# Patient Record
Sex: FEMALE | Race: WHITE | NOT HISPANIC OR LATINO | Employment: FULL TIME | URBAN - METROPOLITAN AREA
[De-identification: names, ages, dates, MRNs, and addresses within clinical notes are randomized per-mention and may not be internally consistent; named-entity substitution may affect disease eponyms.]

---

## 2017-04-23 ENCOUNTER — TRANSFERRED RECORDS (OUTPATIENT)
Dept: HEALTH INFORMATION MANAGEMENT | Facility: CLINIC | Age: 27
End: 2017-04-23

## 2017-04-23 LAB — PAP SMEAR - HIM PATIENT REPORTED: NEGATIVE

## 2017-08-12 ENCOUNTER — HEALTH MAINTENANCE LETTER (OUTPATIENT)
Age: 27
End: 2017-08-12

## 2017-11-17 ENCOUNTER — OFFICE VISIT (OUTPATIENT)
Dept: FAMILY MEDICINE | Facility: CLINIC | Age: 27
End: 2017-11-17
Payer: COMMERCIAL

## 2017-11-17 VITALS
BODY MASS INDEX: 20.14 KG/M2 | DIASTOLIC BLOOD PRESSURE: 66 MMHG | HEART RATE: 71 BPM | SYSTOLIC BLOOD PRESSURE: 102 MMHG | HEIGHT: 69 IN | TEMPERATURE: 98.6 F | OXYGEN SATURATION: 98 % | WEIGHT: 136 LBS

## 2017-11-17 DIAGNOSIS — F90.9 ATTENTION DEFICIT HYPERACTIVITY DISORDER (ADHD), UNSPECIFIED ADHD TYPE: Primary | ICD-10-CM

## 2017-11-17 PROCEDURE — 99000 SPECIMEN HANDLING OFFICE-LAB: CPT | Performed by: PHYSICIAN ASSISTANT

## 2017-11-17 PROCEDURE — 99203 OFFICE O/P NEW LOW 30 MIN: CPT | Performed by: PHYSICIAN ASSISTANT

## 2017-11-17 PROCEDURE — 80307 DRUG TEST PRSMV CHEM ANLYZR: CPT | Mod: 90 | Performed by: PHYSICIAN ASSISTANT

## 2017-11-17 RX ORDER — TRAZODONE HYDROCHLORIDE 50 MG/1
TABLET, FILM COATED ORAL
Refills: 2 | COMMUNITY
Start: 2017-08-18 | End: 2020-09-18

## 2017-11-17 RX ORDER — DEXTROAMPHETAMINE SACCHARATE, AMPHETAMINE ASPARTATE MONOHYDRATE, DEXTROAMPHETAMINE SULFATE AND AMPHETAMINE SULFATE 3.75; 3.75; 3.75; 3.75 MG/1; MG/1; MG/1; MG/1
CAPSULE, EXTENDED RELEASE ORAL
Qty: 30 CAPSULE | Refills: 0 | Status: SHIPPED | OUTPATIENT
Start: 2017-11-17 | End: 2018-04-17 | Stop reason: DRUGHIGH

## 2017-11-17 RX ORDER — DEXTROAMPHETAMINE SACCHARATE, AMPHETAMINE ASPARTATE MONOHYDRATE, DEXTROAMPHETAMINE SULFATE AND AMPHETAMINE SULFATE 3.75; 3.75; 3.75; 3.75 MG/1; MG/1; MG/1; MG/1
CAPSULE, EXTENDED RELEASE ORAL
Refills: 0 | COMMUNITY
Start: 2017-02-05 | End: 2017-11-17

## 2017-11-17 NOTE — PATIENT INSTRUCTIONS
Prescription for the Adderall done today.  Need to have a copy of the assessment for further refills.      Please followup every 6 months for medication renewals.  Please also allow 72 business hours for medication refills to be done.

## 2017-11-17 NOTE — LETTER
[facility] [place]    11/17/17    Patient: Chanel Cotto  YOB: 1990  Medical Record Number: [id number]                                                                  Controlled Substance Agreement  I understand that my care provider has prescribed controlled substances (narcotics, tranquilizers, and/or stimulants) to help manage my condition(s).  I am taking this medicine to help me function or work.  I know that this is strong medicine.  It could have serious side effects and even cause a dependency on the drug.  If I stop these medicines suddenly, I could have severe withdrawal symptoms.    The risks, benefits, and side effects of these medication(s) were explained to me.  I agree that:  1. I will take part in other treatments as advised by my provider.  This may be psychiatry or counseling, physical therapy, behavioral therapy, group treatment, or a referral to a pain clinic.  I will reduce or stop my medicine when my provider tells me to do so.   2. I will take my medicines as prescribed.  I will not change the dose or schedule unless my provider tells me to.  There will be no refills if I  run out early.   I may be contacted at any time without warning and asked to complete a drug test or pill count.   3. I will keep all my appointments at the clinic.  If I miss appointments or fail to follow instructions, my provider may stop my medicine.  4. I will not ask other providers to prescribe controlled substances. And I will not accept controlled substances from other people. If I need another prescribed controlled substance for a new reason, I will notify my provider within one business day.  5. If I enroll in the [place] Medical Marijuana program, I will tell my provider.  I will also sign an agreement to share my medical records with my provider.  6. I will use one pharmacy to fill all of my controlled substance prescriptions.  If my prescription is mailed to my pharmacy, it may take  5 to 7 days for my medicine to be ready.  7. I understand that my provider, clinic care team, and pharmacy can track controlled substance prescriptions from other providers through a central database (prescription monitoring program).  8. I will bring in my list of medications (or my medicine bottles) each time I come to the clinic.  REV- 04/2016                                                                                                                                            Page 1 of 2      [facility] [place]    11/17/17    Patient: Chanel Cotto  YOB: 1990  Medical Record Number: [id number]    9. Refills of controlled substances will be made only during office hours.  It is up to me to make sure that I do not run out of my medicines on weekends or holidays.    10. I am responsible for my prescriptions.  If the medicine is lost or stolen, it will not be replaced.   I also agree not to share these medicines with anyone.  11. I agree to not use ANY illegal or recreational drugs.  This includes marijuana, cocaine, bath salts or other drugs.  I agree not to use alcohol unless my provider says I may.  I agree to give urine samples whenever asked.  If I fail to give a urine sample, the provider may stop my medicine.     12. I will tell my nurse or provider right away if I become pregnant or have a new medical problem treated outside of [facility].  13. I understand that this medicine can affect my thinking and judgment.  It may be unsafe for me to drive, use machinery and do dangerous tasks.  I will not do any of these things until I know how the medicine affects me.  If my dose changes, I will wait to see how it affects me.  I will contact my provider if I have concerns about medicine side effects.  I understand that if I do not follow any of the conditions above, my prescriptions or treatment may be stopped.    I agree that my provider, clinic care team, and pharmacy may work with  any city, state or federal law enforcement agency that investigates the misuse, sale, or other diversion of my controlled medicine. I will allow my provider to discuss my care with or share a copy of this agreement with any other treating provider, pharmacy or emergency room where I receive care.  I agree to give up (waive) any right of privacy or confidentiality with respect to these authorizations.   I have read this agreement and have asked questions about anything I did not understand.   ___________________________________    ___________________________  Patient Signature                                                           Date and Time  ___________________________________     ____________________________  Witness                                                                            Date and Time  ___________________________________  Xuan Ramirez PA-C  REV-  04/2016                                                                                                                                                                 Page 2 of 2

## 2017-11-17 NOTE — Clinical Note
Please abstract the following data from this visit with this patient into the appropriate field in Epic:  Pap smear done on this date: 04/23/2017 (approximately), by this group: [facility], results were normal - Q1Y.

## 2017-11-17 NOTE — MR AVS SNAPSHOT
"              After Visit Summary   11/17/2017    Chanel Cotto    MRN: [id number]           Patient Information     Date Of Birth          1990        Visit Information        Provider Department      11/17/2017 2:20 PM Xuan Ramirez PA-C [facility]        Today's Diagnoses     Attention deficit hyperactivity disorder (ADHD), unspecified ADHD type    -  1      Care Instructions    Prescription for the Adderall done today.  Need to have a copy of the assessment for further refills.      Please followup every 6 months for medication renewals.  Please also allow 72 business hours for medication refills to be done.            Follow-ups after your visit        Who to contact     If you have questions or need follow up information about today's clinic visit or your schedule please contact [facility] directly at [phone].  Normal or non-critical lab and imaging results will be communicated to you by MyChart, letter or phone within 4 business days after the clinic has received the results. If you do not hear from us within 7 days, please contact the clinic through MyChart or phone. If you have a critical or abnormal lab result, we will notify you by phone as soon as possible.  Submit refill requests through [employer] or call your pharmacy and they will forward the refill request to us. Please allow 3 business days for your refill to be completed.          Additional Information About Your Visit        TV Interactive System[employer]art Information     Fooala lets you send messages to your doctor, view your test results, renew your prescriptions, schedule appointments and more. To sign up, go to [email or web address] . Click on \"Log in\" on the left side of the screen, which will take you to the Welcome page. Then click on \"Sign up Now\" on the right side of the page.     You will be asked to enter the access code listed below, as well as some personal information. Please follow the directions to " "create your username and password.     Your access code is: [email or web address]  Expires: 1/10/2018  1:23 PM     Your access code will  in 90 days. If you need help or a new code, please call your [place] clinic or [phone].        Care EveryWhere ID     This is your Care EveryWhere ID. This could be used by other organizations to access your [place] medical records  [id number]        Your Vitals Were     Pulse Temperature Height Pulse Oximetry Breastfeeding? BMI (Body Mass Index)    71 98.6  F (37  C) (Oral) 5' 8.75\" (1.746 m) 98% No 20.23 kg/m2       Blood Pressure from Last 3 Encounters:   17 102/66   12 90/60   09/10/10 102/60    Weight from Last 3 Encounters:   17 136 lb (61.7 kg)   12 126 lb 6.4 oz (57.3 kg)   09 138 lb (62.6 kg) (74 %)*     * Growth percentiles are based on [facility] 2-20 Years data.              We Performed the Following     Drug  Screen Comprehensive, Urine w/o Reported Meds (Pain Care Package)          Today's Medication Changes          These changes are accurate as of: 17  3:41 PM.  If you have any questions, ask your nurse or doctor.               These medicines have changed or have updated prescriptions.        Dose/Directions    amphetamine-dextroamphetamine 15 MG per 24 hr capsule   Commonly known as:  ADDERALL XR   This may have changed:  See the new instructions.   Used for:  Attention deficit hyperactivity disorder (ADHD), unspecified ADHD type   Changed by:  Xuan Ramirez PA-C        Take 1 capsule PO in the morning   Quantity:  30 capsule   Refills:  0            Where to get your medicines      Some of these will need a paper prescription and others can be bought over the counter.  Ask your nurse if you have questions.     Bring a paper prescription for each of these medications     amphetamine-dextroamphetamine 15 MG per 24 hr capsule                Primary Care Provider Office Phone # Fax #    Xu Sales [phone] " [phone]       [address] [address]        Equal Access to Services     ABRAHAM ABEBE : Hadii aad ku hadpascalenory Sojocelyn, waludwinda luqadaha, qaybta kaalmada dorotheamilo, effie vargas yuelove nuñezbrandenbreana ramirez. So [facility] [phone].    ATENCIÓN: Si habla español, tiene a kent disposición servicios gratuitos de asistencia lingüística. Llame al [phone].    We comply with applicable federal civil rights laws and [place] laws. We do not discriminate on the basis of race, color, national origin, age, disability, sex, sexual orientation, or gender identity.            Thank you!     Thank you for choosing [facility]  for your care. Our goal is always to provide you with excellent care. Hearing back from our patients is one way we can continue to improve our services. Please take a few minutes to complete the written survey that you may receive in the mail after your visit with us. Thank you!             Your Updated Medication List - Protect others around you: Learn how to safely use, store and throw away your medicines at www.disposemymeds.org.          This list is accurate as of: 11/17/17  3:41 PM.  Always use your most recent med list.                   Brand Name Dispense Instructions for use Diagnosis    amphetamine-dextroamphetamine 15 MG per 24 hr capsule    ADDERALL XR    30 capsule    Take 1 capsule PO in the morning    Attention deficit hyperactivity disorder (ADHD), unspecified ADHD type       traZODone 50 MG tablet    DESYREL     PRN

## 2017-11-17 NOTE — NURSING NOTE
"Chief Complaint   Patient presents with     Recheck Medication       Initial /66 (BP Location: Left arm, Patient Position: Chair, Cuff Size: Adult Regular)  Pulse 71  Temp 98.6  F (37  C) (Oral)  Ht 5' 8.75\" (1.746 m)  Wt 136 lb (61.7 kg)  SpO2 98%  Breastfeeding? No  BMI 20.23 kg/m2 Estimated body mass index is 20.23 kg/(m^2) as calculated from the following:    Height as of this encounter: 5' 8.75\" (1.746 m).    Weight as of this encounter: 136 lb (61.7 kg).  Medication Reconciliation: complete   Csaba Mlnarik CMA    "

## 2017-11-29 LAB — COMPREHEN DRUG ANALYSIS UR: NORMAL

## 2017-12-01 NOTE — PROGRESS NOTES
Note to staff: Please call the patient to explain results.    Please make an appointment in the clinic to followup on the recent drug screen test results.        Thank you for choosing [facility] for your health care needs,      Xuan Ramirez PA-C

## 2018-01-17 ENCOUNTER — OFFICE VISIT (OUTPATIENT)
Dept: FAMILY MEDICINE | Facility: CLINIC | Age: 28
End: 2018-01-17
Payer: COMMERCIAL

## 2018-01-17 VITALS
HEART RATE: 98 BPM | BODY MASS INDEX: 20.14 KG/M2 | OXYGEN SATURATION: 100 % | TEMPERATURE: 97.6 F | SYSTOLIC BLOOD PRESSURE: 110 MMHG | WEIGHT: 136 LBS | HEIGHT: 69 IN | DIASTOLIC BLOOD PRESSURE: 76 MMHG

## 2018-01-17 DIAGNOSIS — F98.8 ADD (ATTENTION DEFICIT DISORDER) WITHOUT HYPERACTIVITY: Primary | ICD-10-CM

## 2018-01-17 DIAGNOSIS — R89.2 ABNORMAL DRUG SCREEN: ICD-10-CM

## 2018-01-17 PROCEDURE — 99213 OFFICE O/P EST LOW 20 MIN: CPT | Performed by: PHYSICIAN ASSISTANT

## 2018-01-17 NOTE — PROGRESS NOTES
"  SUBJECTIVE:                                                    Chanel Cotto is a 27 year old female who presents to clinic today for the following health issues:    Medication Followup of Adderall    Taking Medication as prescribed: yes    Side Effects:  None    Medication Helping Symptoms:  yes      Patient is here to discuss her Adderall Rx and the recent drug screen.    Patient reports that she does not regularly use marijuana, but did it when visiting sister in [place].  She says that she was in [place] over the holidays and smoked marijuana again then too.      Patient is not out of her Adderall medication.  She took the last pill today.  She reports that she takes the medication every day that she works.  She did not take it when she was off of work for the holidays.      Problem list and histories reviewed & adjusted, as indicated.  Additional history: as documented      ROS:  Constitutional, HEENT, cardiovascular, pulmonary, GI, , musculoskeletal, neuro, skin, endocrine and psych systems are negative, except as otherwise noted.    OBJECTIVE:                                                    /76 (BP Location: Left arm, Patient Position: Chair, Cuff Size: Adult Regular)  Pulse 98  Temp 97.6  F (36.4  C) (Oral)  Ht 5' 8.75\" (1.746 m)  Wt 136 lb (61.7 kg)  SpO2 100%  Breastfeeding? No  BMI 20.23 kg/m2  Body mass index is 20.23 kg/(m^2).  GENERAL: healthy, alert and no distress  EYES: Eyes grossly normal to inspection, PERRL and conjunctivae and sclerae normal  RESP: lungs clear to auscultation - no rales, rhonchi or wheezes  CV: regular rate and rhythm, normal S1 S2, no S3 or S4, no murmur, click or rub, no peripheral edema and peripheral pulses strong  MS: no gross musculoskeletal defects noted, no edema  NEURO: Normal strength and tone, mentation intact and speech normal  PSYCH: mentation appears normal, affect normal/bright    Diagnostic Test Results:  none      ASSESSMENT/PLAN:    "                                                   Chanel was seen today for recheck medication.    Diagnoses and all orders for this visit:    ADD (attention deficit disorder) without hyperactivity  -     Drug  Screen Comprehensive, Urine w/o Reported Meds (Pain Care Package)    Abnormal drug screen  -     Drug  Screen Comprehensive, Urine w/o Reported Meds (Pain Care Package)      - Patient was informed that her recent drug screen was failed as it showed evidence of marijuana use.    - Patient does have a signed contract on file and was reminded of the terms today.   - Urine left today for drug screen to be repeated.   - Patient was informed that the urine screen left today should be clear of illicit and unreported substances for her to continue getting her Adderall Rx from our clinic.   - Also, patient has been reminded that we need the ADD assessment on file from her past clinic as well.      - Followup pending results of drug screen today.    - She should followup sooner if needed.      - Patient agrees with and understands the plan today.      See Patient Instructions:  Please sign a release of records today before leaving.  We need to get that ADD assessment on file.     Also, we had you leave another urine drug screen today.  This needs to be clean of illicit and unreported medications for us to continue prescribing the Adderall medication.         Xuan Ramirez PA-C    [facility] [place]

## 2018-01-17 NOTE — NURSING NOTE
"Chief Complaint   Patient presents with     Recheck Medication       Initial /76 (BP Location: Left arm, Patient Position: Chair, Cuff Size: Adult Regular)  Pulse 98  Temp 97.6  F (36.4  C) (Oral)  Ht 5' 8.75\" (1.746 m)  Wt 136 lb (61.7 kg)  SpO2 100%  Breastfeeding? No  BMI 20.23 kg/m2 Estimated body mass index is 20.23 kg/(m^2) as calculated from the following:    Height as of this encounter: 5' 8.75\" (1.746 m).    Weight as of this encounter: 136 lb (61.7 kg).  Medication Reconciliation: complete   Csaba Mlnarik CMA    "

## 2018-01-17 NOTE — PATIENT INSTRUCTIONS
Please sign a release of records today before leaving.  We need to get that ADD assessment on file.     Also, we had you leave another urine drug screen today.  This needs to be clean of illicit and unreported medications for us to continue prescribing the Adderall medication.

## 2018-01-17 NOTE — MR AVS SNAPSHOT
"              After Visit Summary   1/17/2018    Chanel Cotto    MRN: [id number]           Patient Information     Date Of Birth          1990        Visit Information        Provider Department      1/17/2018 3:20 PM Xuan Ramirez PA-C [facility]        Care Instructions    Please sign a release of records today before leaving.  We need to get that ADD assessment on file.     Also, we had you leave another urine drug screen today.  This needs to be clean of illicit and unreported medications for us to continue prescribing the Adderall medication.                 Follow-ups after your visit        Who to contact     If you have questions or need follow up information about today's clinic visit or your schedule please contact [facility] directly at [phone].  Normal or non-critical lab and imaging results will be communicated to you by MyChart, letter or phone within 4 business days after the clinic has received the results. If you do not hear from us within 7 days, please contact the clinic through MyChart or phone. If you have a critical or abnormal lab result, we will notify you by phone as soon as possible.  Submit refill requests through [employer] or call your pharmacy and they will forward the refill request to us. Please allow 3 business days for your refill to be completed.          Additional Information About Your Visit        CardiaLenhar[employer]endoo Information     [employer] lets you send messages to your doctor, view your test results, renew your prescriptions, schedule appointments and more. To sign up, go to [email or web address] . Click on \"Log in\" on the left side of the screen, which will take you to the Welcome page. Then click on \"Sign up Now\" on the right side of the page.     You will be asked to enter the access code listed below, as well as some personal information. Please follow the directions to create your username and password.     Your access code is: " [email or web address]  Expires: 4/15/2018  5:32 PM     Your access code will  in 90 days. If you need help or a new code, please call your [place] clinic or [phone].        Care EveryWhere ID     This is your Care EveryWhere ID. This could be used by other organizations to access your [place] medical records  [id number]        Your Vitals Were     Pulse Temperature Height Pulse Oximetry Breastfeeding? BMI (Body Mass Index)    98 97.6  F (36.4  C) (Oral) 5' 8.75\" (1.746 m) 100% No 20.23 kg/m2       Blood Pressure from Last 3 Encounters:   18 110/76   17 102/66   12 90/60    Weight from Last 3 Encounters:   18 136 lb (61.7 kg)   17 136 lb (61.7 kg)   12 126 lb 6.4 oz (57.3 kg)              Today, you had the following     No orders found for display       Primary Care Provider Office Phone # Fax #    Xu Sales [phone]       [address] [facility] [address]        Equal Access to Services     [facility] ANDRAE AH: Hadii víctor rodgers hadasho Soomaali, waaxda luqadaha, qaybta kaalmada adeegyada, effie ramirez. So [facility] [phone].    ATENCIÓN: Si habla español, tiene a kent disposición servicios gratuitos de asistencia lingüística. Llame al [phone].    We comply with applicable federal civil rights laws and [place] laws. We do not discriminate on the basis of race, color, national origin, age, disability, sex, sexual orientation, or gender identity.            Thank you!     Thank you for choosing [facility]  for your care. Our goal is always to provide you with excellent care. Hearing back from our patients is one way we can continue to improve our services. Please take a few minutes to complete the written survey that you may receive in the mail after your visit with us. Thank you!             Your Updated Medication List - Protect others around you: Learn how to safely use, store and throw away your " medicines at www.disposemymeds.org.          This list is accurate as of: 1/17/18  4:06 PM.  Always use your most recent med list.                   Brand Name Dispense Instructions for use Diagnosis    amphetamine-dextroamphetamine 15 MG per 24 hr capsule    ADDERALL XR    30 capsule    Take 1 capsule PO in the morning    Attention deficit hyperactivity disorder (ADHD), unspecified ADHD type       traZODone 50 MG tablet    DESYREL     PRN

## 2018-02-01 ENCOUNTER — VIRTUAL VISIT (OUTPATIENT)
Dept: FAMILY MEDICINE | Facility: OTHER | Age: 28
End: 2018-02-01

## 2018-02-01 NOTE — PROGRESS NOTES
"Date:   Clinician: Jodi Alejandra  Clinician NPI: [id number]  Patient: Chanel Cotto  Patient : 1990  Patient Address: [address] [place] [address]  Patient Phone: [phone]  Visit Protocol: URI  Patient Summary:  Chanel is a 27 year old ( : 1990 ) female who initiated a Visit for cold, sinus infection, or influenza. When asked the question \"Please sign me up to receive news, health information and promotions from [employer].\", Chanel responded \"Yes\".    Chanel states her symptoms started suddenly 3-6 days ago. After her symptoms started, they improved and then got worse again.   Her symptoms consist of a sore throat, wheezing, enlarged lymph nodes, and ear pain.   Symptom details     Sore throat: Chanel reports having moderate throat pain (between 4-6 on a 10 point pain scale), does not have exudate on her tonsils, and is able to swallow liquids. The lymph nodes in her neck are enlarged. She states that rashes have not appeared on the skin since the sore throat started.     Wheezing: Chanel has not ever been diagnosed with asthma. The wheezing does not interfere with her normal daily activities.     Chanel denies having malaise, myalgias, facial pain or pressure, cough, chills, teeth pain, fever, headache, dyspnea, nasal congestion, and rhinitis. She also denies taking antibiotic medication for the symptoms and having recent facial or sinus surgery in the past 60 days.   Chanel is not sure if she has been exposed to someone with strep throat.   Weight: 120 lbs   Chanel does not smoke or use smokeless tobacco.   She denies pregnancy and denies breastfeeding. She has menstruated in the past month.    MEDICATIONS:  No current medications , ALLERGIES:  NKDA   Clinician Response:  Dear Chanel,  Based on the information you have provided, you likely have a viral upper respiratory infection, otherwise known as a 'cold'.  Unless you are allergic to the over-the-counter " medication(s) below, I recommend using:   An antihistamine such as Benadryl, Claritin, or store brand.   A decongestant such as Sudafed PE or store brand to help your symptoms.  Acetaminophen (Tylenol), which helps to reduce your discomfort and fever. Take 1-2 pills every 4-6 hours.   Ibuprofen. Take 1-3 tablets (200-600mg) every 8 hours to help with the discomfort. Make sure to take the ibuprofen with food. Do not exceed 2400mg in 24 hours.   Over-the-counter medications do not require a prescription. Ask the pharmacist if you have any questions.  You will feel better faster if you take care of yourself by getting more rest and drinking plenty of liquids, especially water.  Remember to wash your hands often and stay home while you are sick to decrease the chance you will spread your infection to others.  Mild ear pain is a common symptom of an upper respiratory infection and should lessen gradually as other symptoms improve.  Try the following to help with your throat pain and discomfort:     Use throat lozenges    Gargle with warm salt water (1/4 teaspoon of salt per 8 ounce glass of water)    Suck on frozen items such as popsicles or ice cubes     Call 911 or go to the emergency room if you feel that your throat is closing off, you suddenly develop a rash, you are unable to swallow fluids, you are drooling, or you are having difficulty breathing.  Follow up with your primary care provider if your symptoms are not improving in 3-4 days.   Diagnosis: Viral URI  Diagnosis ICD: J06.9  Additional Clinician Notes: Often throat pain is from additional sinus drainage down the back of the throat when we have a cold. You could try a decongestant, expectorant (muccinex) and/or an antihistamine if you are having extra sinus drainage.

## 2018-02-08 ENCOUNTER — TELEPHONE (OUTPATIENT)
Dept: FAMILY MEDICINE | Facility: CLINIC | Age: 28
End: 2018-02-08

## 2018-02-08 NOTE — TELEPHONE ENCOUNTER
Reason for Call:  Other returning call    Detailed comments: Pt returning call from this am    Phone Number Patient can be reached at: Home number on file [phone] (home)    Best Time: anytime    Can we leave a detailed message on this number? YES    Call taken on 2/8/2018 at 1:41 PM by Cami Landa

## 2018-02-08 NOTE — TELEPHONE ENCOUNTER
Attempted to call patient.  Received patients voicemail.  Left a non-detailed message to call back and speak with any triage nurse.        Armida Rodríguez RN    [facility]

## 2018-02-08 NOTE — TELEPHONE ENCOUNTER
Please call Chanel to let her know that there was an error and her recent urine drug screen was not processed.  We will need her to leave another urine.  This has been ordered and she can do this as a lab only appointment anytime in the next week.     Thank you.

## 2018-02-08 NOTE — TELEPHONE ENCOUNTER
Called pt advised of need for urine. The patient indicates understanding of these issues and agrees with the plan.      Armida Rodríguez RN    [facility]

## 2018-03-08 DIAGNOSIS — R89.2 ABNORMAL DRUG SCREEN: ICD-10-CM

## 2018-03-08 DIAGNOSIS — F98.8 ADD (ATTENTION DEFICIT DISORDER) WITHOUT HYPERACTIVITY: ICD-10-CM

## 2018-03-08 PROCEDURE — 80307 DRUG TEST PRSMV CHEM ANLYZR: CPT | Mod: 90 | Performed by: PHYSICIAN ASSISTANT

## 2018-03-08 PROCEDURE — 99000 SPECIMEN HANDLING OFFICE-LAB: CPT | Performed by: PHYSICIAN ASSISTANT

## 2018-03-13 LAB — COMPREHEN DRUG ANALYSIS UR: NORMAL

## 2018-03-13 NOTE — PROGRESS NOTES
Note to staff: Please call the patient to explain results.    The results from your recent lab work are within normal limits.    - The urine drug screen was fine.  We are still waiting on the ADHD evaluation to review before prescribing.  Please be sure you have contacted the clinic at which the evaluation was done to have this sent to us.       Thank you for choosing [facility] for your health care needs,      Xuan Ramirez PA-C

## 2018-03-14 ENCOUNTER — OFFICE VISIT (OUTPATIENT)
Dept: FAMILY MEDICINE | Facility: CLINIC | Age: 28
End: 2018-03-14
Payer: COMMERCIAL

## 2018-03-14 ENCOUNTER — TELEPHONE (OUTPATIENT)
Dept: FAMILY MEDICINE | Facility: CLINIC | Age: 28
End: 2018-03-14

## 2018-03-14 VITALS
TEMPERATURE: 98.6 F | HEART RATE: 87 BPM | WEIGHT: 137 LBS | DIASTOLIC BLOOD PRESSURE: 70 MMHG | BODY MASS INDEX: 20.29 KG/M2 | OXYGEN SATURATION: 98 % | HEIGHT: 69 IN | SYSTOLIC BLOOD PRESSURE: 110 MMHG

## 2018-03-14 DIAGNOSIS — F90.2 ATTENTION DEFICIT HYPERACTIVITY DISORDER (ADHD), COMBINED TYPE: Primary | ICD-10-CM

## 2018-03-14 PROCEDURE — 99214 OFFICE O/P EST MOD 30 MIN: CPT | Performed by: PHYSICIAN ASSISTANT

## 2018-03-14 RX ORDER — DEXTROAMPHETAMINE SACCHARATE, AMPHETAMINE ASPARTATE, DEXTROAMPHETAMINE SULFATE AND AMPHETAMINE SULFATE 1.25; 1.25; 1.25; 1.25 MG/1; MG/1; MG/1; MG/1
TABLET ORAL
Qty: 90 TABLET | Refills: 0 | Status: SHIPPED | OUTPATIENT
Start: 2018-03-14 | End: 2018-04-17

## 2018-03-14 NOTE — PROGRESS NOTES
"  SUBJECTIVE:                                                    Chanel Cotto is a 27 year old female who presents to clinic today for the following health issues:      Medication Followup of Adderall --     Taking Medication as prescribed: yes    Side Effects:  None    Medication Helping Symptoms:  yes    Patient is here today for a medication followup.  She reports that she has not been on her Adderall as we have not yet received the ADHD testing assessment from her prior clinic.  Patient also had a failed drug screen at her appointment in November, but reports to having traveled to [place] and was new to our clinic and did not \"know the rules.\"    She denies any drug use and had a re-screen done in March 2018, that was within limits of reported medication.         She reports to tolerating the Adderall well and denies concerns or side effects.  She is interested in trying the short acting rather than the long acting form as she says if she takes the XR too late in the day it does affect her sleep.      Problem list and histories reviewed & adjusted, as indicated.  Additional history: as documented      ROS:  Constitutional, HEENT, cardiovascular, pulmonary, GI, , musculoskeletal, neuro, skin, endocrine and psych systems are negative, except as otherwise noted.    OBJECTIVE:                                                    /70 (BP Location: Left arm, Patient Position: Chair, Cuff Size: Adult Regular)  Pulse 87  Temp 98.6  F (37  C) (Oral)  Ht 5' 8.75\" (1.746 m)  Wt 137 lb (62.1 kg)  SpO2 98%  Breastfeeding? No  BMI 20.38 kg/m2  Body mass index is 20.38 kg/(m^2).  GENERAL: healthy, alert and no distress  EYES: Eyes grossly normal to inspection, PERRL and conjunctivae and sclerae normal  RESP: lungs clear to auscultation - no rales, rhonchi or wheezes  CV: regular rate and rhythm, normal S1 S2, no S3 or S4, no murmur, click or rub, no peripheral edema and peripheral pulses strong  MS: no gross " musculoskeletal defects noted, no edema  NEURO: Normal strength and tone, mentation intact and speech normal  PSYCH: mentation appears normal, affect normal/bright    Diagnostic Test Results:  none      ASSESSMENT/PLAN:                                                      Chanel was seen today for recheck medication.    Diagnoses and all orders for this visit:    Attention deficit hyperactivity disorder (ADHD), combined type  -     amphetamine-dextroamphetamine (ADDERALL) 5 MG per tablet; Take two tablets (10 mg) in the morning, can take one tablet (5 mg) at least 4 hours later if needed.      - ADHD assessment was received today.  This was done as a self-questionaire at her old primary office, but due to symptoms and an elevated score, the patient was treated with Adderall since about 2013.    - Will have patient changed to the short acting dose with a total of 15 mg in the day.    - Patient advised to followup within the month.  She will not get further refills without followup.  This is a medication check as we have adjusted the dosing schedule of the Adderall today.     - I have discussed the patient's diagnosis, and my plan of treatment with the patient and/or family. Patient is aware to followup if symptoms do not improve.  Patient has been advised to be seen sooner or seek more immediate care if symptoms change or worsen.  Patient agrees with and understands the plan today.     See Patient Instructions        Xuan Ramirez PA-C    [facility] [place]

## 2018-03-14 NOTE — TELEPHONE ENCOUNTER
Reason for Call:  Medication or medication refill:    Do you use a [facility]?  Name of the pharmacy and phone number for the current request:  [facility] [place] [address] [place] [address]      Name of the medication requested: amphetamine-dextroamphetamine (ADDERALL XR) 15 MG per 24 hr capsule     Other request: Patient is calling to check status of refill     Can we leave a detailed message on this number? YES    Phone number patient can be reached at: Home number on file [phone] (home)    Best Time: anytime     Call taken on 3/14/2018 at 3:56 PM by Yudi Leigh

## 2018-03-14 NOTE — MR AVS SNAPSHOT
"              After Visit Summary   3/14/2018    Chanel Cotto    MRN: [id number]           Patient Information     Date Of Birth          1990        Visit Information        Provider Department      3/14/2018 10:40 AM Xuan Ramirez PA-C [facility]        Today's Diagnoses     Attention deficit hyperactivity disorder (ADHD), combined type    -  1       Follow-ups after your visit        Who to contact     If you have questions or need follow up information about today's clinic visit or your schedule please contact [facility] directly at [phone].  Normal or non-critical lab and imaging results will be communicated to you by Spotifyhart, letter or phone within 4 business days after the clinic has received the results. If you do not hear from us within 7 days, please contact the clinic through Spotifyhart or phone. If you have a critical or abnormal lab result, we will notify you by phone as soon as possible.  Submit refill requests through [employer] or call your pharmacy and they will forward the refill request to us. Please allow 3 business days for your refill to be completed.          Additional Information About Your Visit        MyChart Information     Min[employer] lets you send messages to your doctor, view your test results, renew your prescriptions, schedule appointments and more. To sign up, go to [email or web address] . Click on \"Log in\" on the left side of the screen, which will take you to the Welcome page. Then click on \"Sign up Now\" on the right side of the page.     You will be asked to enter the access code listed below, as well as some personal information. Please follow the directions to create your username and password.     Your access code is: [email or web address]  Expires: 4/15/2018  6:32 PM     Your access code will  in 90 days. If you need help or a new code, please call your [facility] or [phone].        Care EveryWhere ID     This is your Care " "EveryWhere ID. This could be used by other organizations to access your [place] medical records  [id number]        Your Vitals Were     Pulse Temperature Height Pulse Oximetry Breastfeeding? BMI (Body Mass Index)    87 98.6  F (37  C) (Oral) 5' 8.75\" (1.746 m) 98% No 20.38 kg/m2       Blood Pressure from Last 3 Encounters:   03/14/18 110/70   01/17/18 110/76   11/17/17 102/66    Weight from Last 3 Encounters:   03/14/18 137 lb (62.1 kg)   01/17/18 136 lb (61.7 kg)   11/17/17 136 lb (61.7 kg)              Today, you had the following     No orders found for display         Today's Medication Changes          These changes are accurate as of 3/14/18 11:59 PM.  If you have any questions, ask your nurse or doctor.               These medicines have changed or have updated prescriptions.        Dose/Directions    * amphetamine-dextroamphetamine 15 MG per 24 hr capsule   Commonly known as:  ADDERALL XR   This may have changed:  Another medication with the same name was added. Make sure you understand how and when to take each.   Used for:  Attention deficit hyperactivity disorder (ADHD), unspecified ADHD type   Changed by:  Xuan Ramirez PA-C        Take 1 capsule PO in the morning   Quantity:  30 capsule   Refills:  0       * amphetamine-dextroamphetamine 5 MG per tablet   Commonly known as:  ADDERALL   This may have changed:  You were already taking a medication with the same name, and this prescription was added. Make sure you understand how and when to take each.   Used for:  Attention deficit hyperactivity disorder (ADHD), combined type   Changed by:  Xuan Ramirez PA-C        Take two tablets (10 mg) in the morning, can take one tablet (5 mg) at least 4 hours later if needed.   Quantity:  90 tablet   Refills:  0       * Notice:  This list has 2 medication(s) that are the same as other medications prescribed for you. Read the directions carefully, and ask your doctor or other care provider to review " them with you.         Where to get your medicines      Some of these will need a paper prescription and others can be bought over the counter.  Ask your nurse if you have questions.     Bring a paper prescription for each of these medications     amphetamine-dextroamphetamine 5 MG per tablet                Primary Care Provider Office Phone # Fax #    Xu Sales [phone]       [address] [place] [address]        Equal Access to Services     ABRAHAM ABEBE : Hadii aad ku hadasho Soomaali, waaxda luqadaha, qaybta kaalmada adeegyada, waxay idiin hayaan adeeg joaquinbrandenbreana lalauren . So [facility] [phone].    ATENCIÓN: Si habla español, tiene a kent disposición servicios gratuitos de asistencia lingüística. [facility] [phone].    We comply with applicable federal civil rights laws and [place] laws. We do not discriminate on the basis of race, color, national origin, age, disability, sex, sexual orientation, or gender identity.            Thank you!     Thank you for choosing [facility]  for your care. Our goal is always to provide you with excellent care. Hearing back from our patients is one way we can continue to improve our services. Please take a few minutes to complete the written survey that you may receive in the mail after your visit with us. Thank you!             Your Updated Medication List - Protect others around you: Learn how to safely use, store and throw away your medicines at www.disposemymeds.org.          This list is accurate as of 3/14/18 11:59 PM.  Always use your most recent med list.                   Brand Name Dispense Instructions for use Diagnosis    * amphetamine-dextroamphetamine 15 MG per 24 hr capsule    ADDERALL XR    30 capsule    Take 1 capsule PO in the morning    Attention deficit hyperactivity disorder (ADHD), unspecified ADHD type       * amphetamine-dextroamphetamine 5 MG per tablet    ADDERALL    90 tablet    Take two tablets (10  mg) in the morning, can take one tablet (5 mg) at least 4 hours later if needed.    Attention deficit hyperactivity disorder (ADHD), combined type       traZODone 50 MG tablet    DESYREL     PRN        * Notice:  This list has 2 medication(s) that are the same as other medications prescribed for you. Read the directions carefully, and ask your doctor or other care provider to review them with you.

## 2018-03-14 NOTE — TELEPHONE ENCOUNTER
No medication has been put through yet. Routing to provider. Pt was seen in clinic today.    Nicolette Rosas RN  [facility]

## 2018-03-14 NOTE — NURSING NOTE
"Chief Complaint   Patient presents with     Recheck Medication       Initial /70 (BP Location: Left arm, Patient Position: Chair, Cuff Size: Adult Regular)  Pulse 87  Temp 98.6  F (37  C) (Oral)  Ht 5' 8.75\" (1.746 m)  Wt 137 lb (62.1 kg)  SpO2 98%  Breastfeeding? No  BMI 20.38 kg/m2 Estimated body mass index is 20.38 kg/(m^2) as calculated from the following:    Height as of this encounter: 5' 8.75\" (1.746 m).    Weight as of this encounter: 137 lb (62.1 kg).  Medication Reconciliation: complete   Csaba Mlnarik CMA    "

## 2018-03-15 NOTE — TELEPHONE ENCOUNTER
Rx for Adderall was sent to the [facility] pharmacy yesterday.  Please inform patient.

## 2018-03-15 NOTE — TELEPHONE ENCOUNTER
Called patient @ # below - advised of LINA ALMEIDA message below - Patient stated an understanding and agreed with plan.    Yoli Zhao RN  [facility]

## 2018-04-17 ENCOUNTER — OFFICE VISIT (OUTPATIENT)
Dept: FAMILY MEDICINE | Facility: CLINIC | Age: 28
End: 2018-04-17
Payer: COMMERCIAL

## 2018-04-17 VITALS
SYSTOLIC BLOOD PRESSURE: 110 MMHG | HEART RATE: 90 BPM | TEMPERATURE: 98.3 F | HEIGHT: 69 IN | OXYGEN SATURATION: 98 % | WEIGHT: 136 LBS | BODY MASS INDEX: 20.14 KG/M2 | DIASTOLIC BLOOD PRESSURE: 72 MMHG

## 2018-04-17 DIAGNOSIS — F90.2 ATTENTION DEFICIT HYPERACTIVITY DISORDER (ADHD), COMBINED TYPE: ICD-10-CM

## 2018-04-17 PROCEDURE — 99213 OFFICE O/P EST LOW 20 MIN: CPT | Performed by: PHYSICIAN ASSISTANT

## 2018-04-17 RX ORDER — DEXTROAMPHETAMINE SACCHARATE, AMPHETAMINE ASPARTATE, DEXTROAMPHETAMINE SULFATE AND AMPHETAMINE SULFATE 1.25; 1.25; 1.25; 1.25 MG/1; MG/1; MG/1; MG/1
TABLET ORAL
Qty: 90 TABLET | Refills: 0 | Status: SHIPPED | OUTPATIENT
Start: 2018-04-17 | End: 2018-05-17

## 2018-04-17 NOTE — MR AVS SNAPSHOT
"              After Visit Summary   4/17/2018    Chanel Cotto    MRN: [id number]           Patient Information     Date Of Birth          1990        Visit Information        Provider Department      4/17/2018 8:20 AM Xuan Ramirez PA-C [facility]        Today's Diagnoses     Attention deficit hyperactivity disorder (ADHD), combined type          Care Instructions    Please continue the Adderall as prescribed.     Please followup in 6 months, or sooner if needed.     Please call the clinic monthly to get refills of the Adderall.               Follow-ups after your visit        Follow-up notes from your care team     Return in about 6 months (around 10/17/2018), or if symptoms worsen or fail to improve, for medication re-check.      Who to contact     If you have questions or need follow up information about today's clinic visit or your schedule please contact [facility] directly at [phone].  Normal or non-critical lab and imaging results will be communicated to you by MyChart, letter or phone within 4 business days after the clinic has received the results. If you do not hear from us within 7 days, please contact the clinic through SolveBiohart or phone. If you have a critical or abnormal lab result, we will notify you by phone as soon as possible.  Submit refill requests through [employer] or call your pharmacy and they will forward the refill request to us. Please allow 3 business days for your refill to be completed.          Additional Information About Your Visit        MyChart Information     [employer] lets you send messages to your doctor, view your test results, renew your prescriptions, schedule appointments and more. To sign up, go to [email or web address] . Click on \"Log in\" on the left side of the screen, which will take you to the Welcome page. Then click on \"Sign up Now\" on the right side of the page.     You will be asked to enter the access code listed " "below, as well as some personal information. Please follow the directions to create your username and password.     Your access code is: [email or web address]  Expires: 2018  8:28 AM     Your access code will  in 90 days. If you need help or a new code, please call your [facility] or [phone].        Care EveryWhere ID     This is your Care EveryWhere ID. This could be used by other organizations to access your [place] medical records  [id number]        Your Vitals Were     Pulse Temperature Height Pulse Oximetry Breastfeeding? BMI (Body Mass Index)    90 98.3  F (36.8  C) (Oral) 5' 8.75\" (1.746 m) 98% No 20.23 kg/m2       Blood Pressure from Last 3 Encounters:   18 110/72   18 110/70   18 110/76    Weight from Last 3 Encounters:   18 136 lb (61.7 kg)   18 137 lb (62.1 kg)   18 136 lb (61.7 kg)              Today, you had the following     No orders found for display         Today's Medication Changes          These changes are accurate as of 18  8:28 AM.  If you have any questions, ask your nurse or doctor.               These medicines have changed or have updated prescriptions.        Dose/Directions    amphetamine-dextroamphetamine 5 MG per tablet   Commonly known as:  ADDERALL   This may have changed:  Another medication with the same name was removed. Continue taking this medication, and follow the directions you see here.   Used for:  Attention deficit hyperactivity disorder (ADHD), combined type   Changed by:  Xuan Ramirez PA-C        Take two tablets (10 mg) in the morning, can take one tablet (5 mg) at least 4 hours later if needed.   Quantity:  90 tablet   Refills:  0            Where to get your medicines      Some of these will need a paper prescription and others can be bought over the counter.  Ask your nurse if you have questions.     Bring a paper prescription for each of these medications     amphetamine-dextroamphetamine 5 MG per tablet "                Primary Care Provider Office Phone # Fax #    Xu Sales [phone] [address] [facility] [address]        Equal Access to Services     VIVIENNEFERDINAND ANDRAE : Hadmichael víctor rodgers devo Sojocelyn, waaxda luqadaha, qaybta kaalmada adedarielada, effie clayton laEladiasteffen ramirez. So [facility] [phone].    ATENCIÓN: Si habla español, tiene a kent disposición servicios gratuitos de asistencia lingüística. Llame al [phone].    We comply with applicable federal civil rights laws and [place] laws. We do not discriminate on the basis of race, color, national origin, age, disability, sex, sexual orientation, or gender identity.            Thank you!     Thank you for choosing [facility]  for your care. Our goal is always to provide you with excellent care. Hearing back from our patients is one way we can continue to improve our services. Please take a few minutes to complete the written survey that you may receive in the mail after your visit with us. Thank you!             Your Updated Medication List - Protect others around you: Learn how to safely use, store and throw away your medicines at www.disposemymeds.org.          This list is accurate as of 4/17/18  8:28 AM.  Always use your most recent med list.                   Brand Name Dispense Instructions for use Diagnosis    amphetamine-dextroamphetamine 5 MG per tablet    ADDERALL    90 tablet    Take two tablets (10 mg) in the morning, can take one tablet (5 mg) at least 4 hours later if needed.    Attention deficit hyperactivity disorder (ADHD), combined type       traZODone 50 MG tablet    DESYREL     PRN

## 2018-04-17 NOTE — PROGRESS NOTES
"  SUBJECTIVE:                                                    Chanel Cotot is a 27 year old female who presents to clinic today for the following health issues:      Medication Followup of Adderall    Taking Medication as prescribed: yes    Side Effects:  None    Medication Helping Symptoms:  yes    Patient reports that she is doing well on the current medication.  She says that the short acting is better for her sleep patterns at night.  She takes the 10 mg before her work shift and then takes the 5 mg tablet at least 4 hours apart.      She does not have concerns today.      Problem list and histories reviewed & adjusted, as indicated.  Additional history: as documented      ROS:  Constitutional, HEENT, cardiovascular, pulmonary, GI, , musculoskeletal, neuro, skin, endocrine and psych systems are negative, except as otherwise noted.    OBJECTIVE:                                                    /72 (BP Location: Left arm, Patient Position: Chair, Cuff Size: Adult Regular)  Pulse 90  Temp 98.3  F (36.8  C) (Oral)  Ht 5' 8.75\" (1.746 m)  Wt 136 lb (61.7 kg)  SpO2 98%  Breastfeeding? No  BMI 20.23 kg/m2  Body mass index is 20.23 kg/(m^2).  GENERAL: healthy, alert and no distress  EYES: Eyes grossly normal to inspection, PERRL and conjunctivae and sclerae normal  RESP: lungs clear to auscultation - no rales, rhonchi or wheezes  CV: regular rate and rhythm, normal S1 S2, no S3 or S4, no murmur, click or rub, no peripheral edema and peripheral pulses strong  MS: no gross musculoskeletal defects noted, no edema  NEURO: Normal strength and tone, mentation intact and speech normal  PSYCH: mentation appears normal, affect normal/bright    Diagnostic Test Results:  none      ASSESSMENT/PLAN:                                                      Chanel was seen today for recheck medication.    Diagnoses and all orders for this visit:    Attention deficit hyperactivity disorder (ADHD), combined type  -   "   amphetamine-dextroamphetamine (ADDERALL) 5 MG per tablet; Take two tablets (10 mg) in the morning, can take one tablet (5 mg) at least 4 hours later if needed.    - Patient will continue the Adderall as prescribed.  It is working well for her and she denies any concerns about side effects.      -- I have discussed the patient's diagnosis, and my plan of treatment with the patient and/or family. Patient is aware to followup if symptoms do not improve.  Patient has been advised to be seen sooner or seek more immediate care if symptoms change or worsen.  Patient agrees with and understands the plan today.     See Patient Instructions:  Please continue the Adderall as prescribed.     Please followup in 6 months, or sooner if needed.     Please call the clinic monthly to get refills of the Adderall.         Xuan Ramirez PA-C    [facility] [place]

## 2018-04-17 NOTE — NURSING NOTE
"Chief Complaint   Patient presents with     Recheck Medication       Initial /72 (BP Location: Left arm, Patient Position: Chair, Cuff Size: Adult Regular)  Pulse 90  Temp 98.3  F (36.8  C) (Oral)  Ht 5' 8.75\" (1.746 m)  Wt 136 lb (61.7 kg)  SpO2 98%  Breastfeeding? No  BMI 20.23 kg/m2 Estimated body mass index is 20.23 kg/(m^2) as calculated from the following:    Height as of this encounter: 5' 8.75\" (1.746 m).    Weight as of this encounter: 136 lb (61.7 kg).  Medication Reconciliation: complete   Csaba Mlnarik CMA    "

## 2018-04-17 NOTE — PATIENT INSTRUCTIONS
Please continue the Adderall as prescribed.     Please followup in 6 months, or sooner if needed.     Please call the clinic monthly to get refills of the Adderall.

## 2018-04-23 ENCOUNTER — TRANSFERRED RECORDS (OUTPATIENT)
Dept: HEALTH INFORMATION MANAGEMENT | Facility: CLINIC | Age: 28
End: 2018-04-23

## 2018-04-23 LAB — PAP SMEAR - HIM PATIENT REPORTED: NEGATIVE

## 2018-05-17 DIAGNOSIS — F90.2 ATTENTION DEFICIT HYPERACTIVITY DISORDER (ADHD), COMBINED TYPE: ICD-10-CM

## 2018-05-17 NOTE — TELEPHONE ENCOUNTER
Medication Detail         Disp Refills Start End YRIS     amphetamine-dextroamphetamine (ADDERALL) 5 MG per tablet 90 tablet 0 4/17/2018  No     Sig: Take two tablets (10 mg) in the morning, can take one tablet (5 mg) at least 4 hours later if needed.     Problem List Complete:  No     PROVIDER TO CONSIDER COMPLETION OF PROBLEM LIST AND OVERVIEW/CONTROLLED SUBSTANCE AGREEMENT    Last Office Visit with [facility] primary care provider: 04/17/2018    Future Office visit:     Controlled substance agreement on file: No.     Processing:  Staff will hand deliver Rx to on-site pharmacy    Routing refill request to provider for review/approval because:  Drug not on the [facility] refill protocol         Yoli Zhao RN  [facility]

## 2018-05-21 RX ORDER — DEXTROAMPHETAMINE SACCHARATE, AMPHETAMINE ASPARTATE, DEXTROAMPHETAMINE SULFATE AND AMPHETAMINE SULFATE 1.25; 1.25; 1.25; 1.25 MG/1; MG/1; MG/1; MG/1
TABLET ORAL
Qty: 90 TABLET | Refills: 0 | Status: SHIPPED | OUTPATIENT
Start: 2018-05-21 | End: 2018-06-18

## 2018-06-18 DIAGNOSIS — F90.2 ATTENTION DEFICIT HYPERACTIVITY DISORDER (ADHD), COMBINED TYPE: ICD-10-CM

## 2018-06-18 NOTE — TELEPHONE ENCOUNTER
Controlled Substance Refill Request for Adderall 5mg  Problem List Complete:  Yes    Last Written Prescription Date:  5/21/2018  Last Fill Quantity: 90,   # refills: 0    [place] [facility] [place]: 4/17/2018    Clinic visit frequency required: none noted     Future Office visit:     Controlled substance agreement on file: Yes:  Date 11/17/2017.     Processing:  Staff will hand deliver Rx to on-site pharmacy   checked in past 6 months?  No, route to RN   Routing refill request to provider for review/approval because:  Drug not on the [facility] refill protocol   Nicolette Rosas RN  [place] [facility]

## 2018-06-18 NOTE — TELEPHONE ENCOUNTER
Reason for Call:  Medication or medication refill:    Do you use a [facility]?  Name of the pharmacy and phone number for the current request:  [facility] - [phone]    Name of the medication requested: amphetamine-dextroamphetamine (ADDERALL) 5 MG per tablet    Other request: na    Can we leave a detailed message on this number? YES    Phone number patient can be reached at: Home number on file [phone] (home)    Best Time:na    Call taken on 6/18/2018 at 6:21 PM by Naya Arevalo       [phone]

## 2018-06-20 RX ORDER — DEXTROAMPHETAMINE SACCHARATE, AMPHETAMINE ASPARTATE, DEXTROAMPHETAMINE SULFATE AND AMPHETAMINE SULFATE 1.25; 1.25; 1.25; 1.25 MG/1; MG/1; MG/1; MG/1
TABLET ORAL
Qty: 90 TABLET | Refills: 0 | Status: SHIPPED | OUTPATIENT
Start: 2018-06-20 | End: 2018-07-23

## 2018-07-23 DIAGNOSIS — F90.2 ATTENTION DEFICIT HYPERACTIVITY DISORDER (ADHD), COMBINED TYPE: ICD-10-CM

## 2018-07-23 NOTE — TELEPHONE ENCOUNTER
amphetamine-dextroamphetamine (ADDERALL) 5 MG per tablet      Last Written Prescription Date:  6-  Last Fill Quantity: 90 tablet,   # refills: 0  Last Office Visit: 4-  Future Office visit:       Routing refill request to provider for review/approval because:  Drug not on the [facility], [facility] or [facility] refill protocol or controlled substance

## 2018-07-24 RX ORDER — DEXTROAMPHETAMINE SACCHARATE, AMPHETAMINE ASPARTATE, DEXTROAMPHETAMINE SULFATE AND AMPHETAMINE SULFATE 1.25; 1.25; 1.25; 1.25 MG/1; MG/1; MG/1; MG/1
TABLET ORAL
Qty: 90 TABLET | Refills: 0 | Status: SHIPPED | OUTPATIENT
Start: 2018-07-24 | End: 2018-08-22

## 2018-07-24 NOTE — TELEPHONE ENCOUNTER
Prescription(s) signed and in the [facility].  Please process and notify the patient, if needed.

## 2018-08-22 DIAGNOSIS — F90.2 ATTENTION DEFICIT HYPERACTIVITY DISORDER (ADHD), COMBINED TYPE: ICD-10-CM

## 2018-08-22 NOTE — TELEPHONE ENCOUNTER
Reason for Call:  Medication or medication refill:    Do you use a [facility]?  Name of the pharmacy and phone number for the current request:  [facility] [place] [address] [place] [address]     Name of the medication requested: amphetamine-dextroamphetamine (ADDERALL) 5 MG per tablet    Can we leave a detailed message on this number? YES    Phone number patient can be reached at: Home number on file [phone] (home)    Best Time: Anytime    Call taken on 8/22/2018 at 12:30 PM by Bernie Arora

## 2018-08-23 NOTE — TELEPHONE ENCOUNTER
Controlled Substance Refill Request for   amphetamine-dextroamphetamine (ADDERALL) 5 MG per tablet 90 tablet 0 7/24/2018  No   Sig: Take two tablets (10 mg) in the morning, can take one tablet (5 mg) at least 4 hours later if needed.   Class: Local Print         Problem List Complete:  No     PROVIDER TO CONSIDER COMPLETION OF PROBLEM LIST AND OVERVIEW/CONTROLLED SUBSTANCE AGREEMENT        Last Office Visit with [facility] primary care provider: 4/17/2018    Future Office visit:     Controlled substance agreement on file: No.     Processing:  Staff will hand deliver Rx to on-site pharmacy   checked in past 3 months?  No, route to RN     Ailyn Castillo RN, BSN  [facility]

## 2018-08-24 RX ORDER — DEXTROAMPHETAMINE SACCHARATE, AMPHETAMINE ASPARTATE, DEXTROAMPHETAMINE SULFATE AND AMPHETAMINE SULFATE 1.25; 1.25; 1.25; 1.25 MG/1; MG/1; MG/1; MG/1
TABLET ORAL
Qty: 90 TABLET | Refills: 0 | Status: SHIPPED | OUTPATIENT
Start: 2018-08-24 | End: 2018-09-24

## 2018-08-24 NOTE — TELEPHONE ENCOUNTER
Pt called back and advised to make josef in order to get the next refill and the pt will call back to do so after she check her schedule      Matt Escobar

## 2018-08-24 NOTE — TELEPHONE ENCOUNTER
Walked Rx to [facility].  Left non-detailed message for patient to call back.  Please schedule follow up when patient calls back.  (see previous notes for details)    Teagan Enciso

## 2018-08-24 NOTE — TELEPHONE ENCOUNTER
~ 6 months since last followup appointment. - needs to make an appointment prior to next refill request.  I signed a prescription and it is in the NORTH in basket

## 2018-09-24 DIAGNOSIS — F90.2 ATTENTION DEFICIT HYPERACTIVITY DISORDER (ADHD), COMBINED TYPE: ICD-10-CM

## 2018-09-24 RX ORDER — DEXTROAMPHETAMINE SACCHARATE, AMPHETAMINE ASPARTATE, DEXTROAMPHETAMINE SULFATE AND AMPHETAMINE SULFATE 1.25; 1.25; 1.25; 1.25 MG/1; MG/1; MG/1; MG/1
TABLET ORAL
Qty: 90 TABLET | Refills: 0 | Status: SHIPPED | OUTPATIENT
Start: 2018-09-24 | End: 2018-10-24

## 2018-09-24 NOTE — TELEPHONE ENCOUNTER
Prescription(s) signed and in the [facility].  Please process and notify the patient, if needed.

## 2018-09-24 NOTE — TELEPHONE ENCOUNTER
Routing refill request to provider for review/approval because:  Drug not on the [facility] refill protocol     Ailyn Castillo RN, BSN  [place] [facility]

## 2018-09-24 NOTE — TELEPHONE ENCOUNTER
Controlled Substance Refill Request for amphetamine-dextroamphetamine (ADDERALL) 5 MG per tablet  Problem List Complete:  Yes    Last Written Prescription Date:  8.24.18  Last Fill Quantity: 90,   # refills: 0    [place] [facility] [place]: 4.17.18    Clinic visit frequency required: .     Future Office visit:   Next 5 appointments (look out 90 days)     Sep 28, 2018  2:20 PM CDT   [facility] with [facility] Sales [facility]   [facility]    [address] [place] [id number] [phone]                  Controlled substance agreement on file: No.     Processing:  Fax Rx to listed pharmacy   checked in past 3 months?  No, route to RN

## 2018-10-24 DIAGNOSIS — F90.2 ATTENTION DEFICIT HYPERACTIVITY DISORDER (ADHD), COMBINED TYPE: ICD-10-CM

## 2018-10-24 NOTE — TELEPHONE ENCOUNTER
Controlled Substance Refill Request for amphetamine-dextroamphetamine (ADDERALL) 5 MG per tablet  Problem List Complete:  Yes    Last Written Prescription Date:  9.24.18  Last Fill Quantity: 90,   # refills: 0    [place] [facility] [place]: 4.17.18    Clinic visit frequency required:      Future Office visit:     Controlled substance agreement on file: No.     Processing:  Fax Rx to listed pharmacy   checked in past 3 months?  No, route to RN

## 2018-10-24 NOTE — TELEPHONE ENCOUNTER
Routing refill request to provider for review/approval because:  Drug not on the [facility] refill protocol     Ailyn Castillo RN, BSN  [place] [facility]

## 2018-10-25 RX ORDER — DEXTROAMPHETAMINE SACCHARATE, AMPHETAMINE ASPARTATE, DEXTROAMPHETAMINE SULFATE AND AMPHETAMINE SULFATE 1.25; 1.25; 1.25; 1.25 MG/1; MG/1; MG/1; MG/1
TABLET ORAL
Qty: 90 TABLET | Refills: 0 | Status: SHIPPED | OUTPATIENT
Start: 2018-10-25 | End: 2018-11-27

## 2018-10-25 NOTE — TELEPHONE ENCOUNTER
Walked over to [facility]. Nettie Martinez CMA    Still needs appt for next fill. Please call and make appt. Nettie Martinez CMA

## 2018-11-01 NOTE — TELEPHONE ENCOUNTER
Left non-detailed message for patient to call back.  Please schedule follow up when patient calls back.  (see previous notes for details)    Teagan Enciso

## 2018-11-27 ENCOUNTER — TELEPHONE (OUTPATIENT)
Dept: FAMILY MEDICINE | Facility: CLINIC | Age: 28
End: 2018-11-27

## 2018-11-27 DIAGNOSIS — F90.2 ATTENTION DEFICIT HYPERACTIVITY DISORDER (ADHD), COMBINED TYPE: ICD-10-CM

## 2018-11-27 NOTE — TELEPHONE ENCOUNTER
Controlled Substance Refill Request for amphetamine-dextroamphetamine (ADDERALL) 5 MG per tablet  Problem List Complete:  Yes    Last Written Prescription Date:  10.25.18  Last Fill Quantity: 90,   # refills: 0    Last Office Visit with [facility] primary care provider: 9.28.18    Clinic visit frequency required: -     Future Office visit:     Controlled substance agreement on file: No.     Processing:  Fax Rx to listed pharmacy   checked in past 3 months?  No, route to RN

## 2018-11-28 NOTE — TELEPHONE ENCOUNTER
Routing refill request to provider for review/approval because:  Drug not on the [facility] refill protocol     Ailyn Castillo RN, BSN  [place] [facility]

## 2018-11-29 ENCOUNTER — VIRTUAL VISIT (OUTPATIENT)
Dept: FAMILY MEDICINE | Facility: CLINIC | Age: 28
End: 2018-11-29
Payer: COMMERCIAL

## 2018-11-29 DIAGNOSIS — F90.2 ATTENTION DEFICIT HYPERACTIVITY DISORDER (ADHD), COMBINED TYPE: ICD-10-CM

## 2018-11-29 PROCEDURE — 99441 ZZC PHYSICIAN TELEPHONE EVALUATION 5-10 MIN: CPT | Performed by: FAMILY MEDICINE

## 2018-11-29 RX ORDER — DEXTROAMPHETAMINE SACCHARATE, AMPHETAMINE ASPARTATE, DEXTROAMPHETAMINE SULFATE AND AMPHETAMINE SULFATE 1.25; 1.25; 1.25; 1.25 MG/1; MG/1; MG/1; MG/1
TABLET ORAL
Qty: 90 TABLET | Refills: 0 | Status: SHIPPED | OUTPATIENT
Start: 2018-11-29 | End: 2018-11-29

## 2018-11-29 RX ORDER — DEXTROAMPHETAMINE SACCHARATE, AMPHETAMINE ASPARTATE, DEXTROAMPHETAMINE SULFATE AND AMPHETAMINE SULFATE 1.25; 1.25; 1.25; 1.25 MG/1; MG/1; MG/1; MG/1
TABLET ORAL
Qty: 90 TABLET | Refills: 0 | Status: SHIPPED | OUTPATIENT
Start: 2018-12-29 | End: 2018-11-29

## 2018-11-29 RX ORDER — DEXTROAMPHETAMINE SACCHARATE, AMPHETAMINE ASPARTATE, DEXTROAMPHETAMINE SULFATE AND AMPHETAMINE SULFATE 1.25; 1.25; 1.25; 1.25 MG/1; MG/1; MG/1; MG/1
TABLET ORAL
Qty: 90 TABLET | Refills: 0 | Status: SHIPPED | OUTPATIENT
Start: 2019-01-29 | End: 2019-05-06

## 2018-11-29 NOTE — MR AVS SNAPSHOT
"              After Visit Summary   2018    Chanel Cotto    MRN: [id number]           Patient Information     Date Of Birth          1990        Visit Information        Provider Department      2018 4:40 PM Xu Sales MD [facility]        Today's Diagnoses     Attention deficit hyperactivity disorder (ADHD), combined type           Follow-ups after your visit        Follow-up notes from your care team     Return in about 6 months (around 2019) for Medication Recheck.      Who to contact     If you have questions or need follow up information about today's clinic visit or your schedule please contact [facility] directly at [phone].  Normal or non-critical lab and imaging results will be communicated to you by MyChart, letter or phone within 4 business days after the clinic has received the results. If you do not hear from us within 7 days, please contact the clinic through MyChart or phone. If you have a critical or abnormal lab result, we will notify you by phone as soon as possible.  Submit refill requests through [employer] or call your pharmacy and they will forward the refill request to us. Please allow 3 business days for your refill to be completed.          Additional Information About Your Visit        MyChart Information     [employer] lets you send messages to your doctor, view your test results, renew your prescriptions, schedule appointments and more. To sign up, go to [email or web address] . Click on \"Log in\" on the left side of the screen, which will take you to the Welcome page. Then click on \"Sign up Now\" on the right side of the page.     You will be asked to enter the access code listed below, as well as some personal information. Please follow the directions to create your username and password.     Your access code is: [email or web address]  Expires: 2018  2:05 PM     Your access code will  in 90 days. If you need help or a " new code, please call your [facility] clinic or [phone].        Care EveryWhere ID     This is your Care EveryWhere ID. This could be used by other organizations to access your [facility] medical records  [id number]         Blood Pressure from Last 3 Encounters:   04/17/18 110/72   03/14/18 110/70   01/17/18 110/76    Weight from Last 3 Encounters:   04/17/18 136 lb (61.7 kg)   03/14/18 137 lb (62.1 kg)   01/17/18 136 lb (61.7 kg)              Today, you had the following     No orders found for display         Today's Medication Changes          These changes are accurate as of 11/29/18  5:15 PM.  If you have any questions, ask your nurse or doctor.               Start taking these medicines.        Dose/Directions    amphetamine-dextroamphetamine 5 MG tablet   Commonly known as:  ADDERALL   Used for:  Attention deficit hyperactivity disorder (ADHD), combined type        Start taking on:  1/29/2019   Take two tablets (10 mg) in the morning, can take one tablet (5 mg) at least 4 hours later if needed.   Quantity:  90 tablet   Refills:  0            Where to get your medicines      Some of these will need a paper prescription and others can be bought over the counter.  Ask your nurse if you have questions.     Bring a paper prescription for each of these medications     amphetamine-dextroamphetamine 5 MG tablet                Primary Care Provider Office Phone # Fax #    Xu Sales [phone]       [address] [place] [address]        Equal Access to Services     ABRAHAM ABEBE AH: Hadii víctor méndezo Sojocelyn, waaxda luqadaha, qaybta kaalmada adeegyada, waxyoselyn sam carson talibclint ramirez. So [facility] [phone].    ATENCIÓN: Si habla español, tiene a kent disposición servicios gratuitos de asistencia lingüística. Llame al [phone].    We comply with applicable federal civil rights laws and [place] laws. We do not discriminate on the basis of race, color, national origin,  age, disability, sex, sexual orientation, or gender identity.            Thank you!     Thank you for choosing [facility]  for your care. Our goal is always to provide you with excellent care. Hearing back from our patients is one way we can continue to improve our services. Please take a few minutes to complete the written survey that you may receive in the mail after your visit with us. Thank you!             Your Updated Medication List - Protect others around you: Learn how to safely use, store and throw away your medicines at www.disposemymeds.org.          This list is accurate as of 11/29/18  5:15 PM.  Always use your most recent med list.                   Brand Name Dispense Instructions for use Diagnosis    amphetamine-dextroamphetamine 5 MG tablet   Start taking on:  1/29/2019    ADDERALL    90 tablet    Take two tablets (10 mg) in the morning, can take one tablet (5 mg) at least 4 hours later if needed.    Attention deficit hyperactivity disorder (ADHD), combined type       traZODone 50 MG tablet    DESYREL     PRN

## 2018-11-29 NOTE — TELEPHONE ENCOUNTER
Rx walked to [facility]    Yoli Zhao, GENEVA  [facility]

## 2018-11-29 NOTE — PROGRESS NOTES
"  SUBJECTIVE:                                                    Chanel Cotto is a 28 year old female who is being evaluated via a telephone visit.      The patient has been notified of following:     \"This telephone visit will be conducted via a call between you and your physician/provider. We have found that certain health care needs can be provided without the need for a physical exam.  This service lets us provide the care you need with a short phone conversation.  If a prescription is necessary we can send it directly to your pharmacy.  If lab work is needed we can place an order for that and you can then stop by our lab to have the test done at a later time.    We will bill your insurance company for this service.  Please check with your medical insurance if this type of visit is covered. You may be responsible for the cost of this type of visit if insurance coverage is denied.  The typical cost is $30 (10min), $59 (11-20min) and $85 (21-30min).  Most often these visits are shorter than 10 minutes.    If during the course of the call the physician/provider feels a telephone visit is not appropriate, you will not be charged for this service.\"       Consent has been obtained for this service by care team member: yes.   See the scanned image in the medical record.    Chanel Cotto complains of  Recheck Medication      I have reviewed and updated the patient's Past Medical History, Social History, Family History and Medication List.    ALLERGIES  Rubus fruticosus and Gluten meal    Nettie Martinez CMA   (MA signature)    Additional provider notes:     Medication Followup of ADHD/ Adderall    Taking Medication as prescribed: yes    Side Effects:  None    Medication Helping Symptoms:  Yes    Chanel would like to switch over to taking 3 tablets of 5mg instead of 1 tablet of 15mg. She's sleeping rather well on current medication. She's cut down on her coffee intake to 1 cup from 4-5 cups. Denies appetite " issues, mood shifts. Takes trazodone as needed. She's content on her current therapy course.        Had her tetanus done on Nov 15th.     PAP Smear around 4/23/2018 at [facility]; result normal       Assessment/Plan:  Chanel was seen today for recheck medication.    Diagnoses and all orders for this visit:    Attention deficit hyperactivity disorder (ADHD), combined type - Stable, dosage split as noted, refilled, following up in 6 months.   -     amphetamine-dextroamphetamine (ADDERALL) 5 MG tablet; Take two tablets (10 mg) in the morning, can take one tablet (5 mg) at least 4 hours later if needed.    Prescriptions were dropped off at our pharmacy here at the clinic.   This document serves as a record of the services and decisions personally performed and made by Xu Sales MD. It was created on his behalf by Khurram Garcia, a trained medical scribe. The creation of this document is based the provider's statements to the medical scribe.  Scribe Khurram Garcia 4:51 PM, November 29, 2018      Total time of call between patient and provider was 5 minutes          Xu Sales MD    [facility]

## 2018-11-29 NOTE — Clinical Note
Please abstract the following data from this visit with this patient into the appropriate field in Epic:  Pap smear done on this date: 4/23/2018 (approximately), by this group: [facility], results were normal.

## 2018-11-29 NOTE — TELEPHONE ENCOUNTER
Prescription(s) signed and in the [facility].  Please process and notify the patient, if needed.

## 2019-05-06 DIAGNOSIS — F90.2 ATTENTION DEFICIT HYPERACTIVITY DISORDER (ADHD), COMBINED TYPE: ICD-10-CM

## 2019-05-06 RX ORDER — DEXTROAMPHETAMINE SACCHARATE, AMPHETAMINE ASPARTATE, DEXTROAMPHETAMINE SULFATE AND AMPHETAMINE SULFATE 1.25; 1.25; 1.25; 1.25 MG/1; MG/1; MG/1; MG/1
TABLET ORAL
Qty: 90 TABLET | Refills: 0 | Status: SHIPPED | OUTPATIENT
Start: 2019-05-06 | End: 2019-06-03

## 2019-05-06 NOTE — TELEPHONE ENCOUNTER
Controlled Substance Refill Request for amphetamine-dextroamphetamine (ADDERALL) 5 MG tablet  Problem List Complete:    Yes    Last Written Prescription Date:  1.29.19  Last Fill Quantity: 90 tablet,   # refills: 0    Last Office Visit with [facility] primary care provider: 11.27.18    Future Office visit:   Next 5 appointments (look out 90 days)    May 15, 2019  2:20 PM CDT  [facility] [address] [place] [id number]  [phone]          Controlled substance agreement:   Encounter-Level CSA - 11/17/2017:    Controlled Substance Agreement - Scan on 12/1/2017 10:43 AM: CONTROLLED SUBSTANCE AGREEMENT (below)       Patient-Level CSA:    There are no patient-level csa.         Last Urine Drug Screen: No results found for: SAMMY   [facility] Drug Analysis [facility]   Date Value Ref Range Status   03/08/2018 FINAL  Final     Comment:     (Note)  ====================================================================  COMPREHENSIVE DRUG ANALYSIS,UR  ====================================================================  Test                             Result       Flag       Units        Drug Present   Amphetamine                    342                     ng/mg creat    Amphetamine is available as a schedule II prescription drug.  ====================================================================  Test                      Result    Flag   Units      Ref Range        Creatinine              145              mg/dL      >=20            ====================================================================  For clinical consultation, please call [phone].  ====================================================================  Analysis performed by [employer] [place] [address]     , No results found for: THC13, PCP13, COC13, MAMP13, OPI13, AMP13, BZO13, TCA13, MTD13, BAR13, OXY13, PPX13, BUP13     Processing:   Fax Rx to listed pharmacy    [email or web address]   checked in past 3 months?  No, route to RN

## 2019-05-06 NOTE — TELEPHONE ENCOUNTER
Routing refill request to provider for review/approval because:  Drug not on the [facility] refill protocol   Nicolette Rosas RN  [place] [facility]

## 2019-05-07 NOTE — TELEPHONE ENCOUNTER
Prescription(s) signed and in the [facility].  Please process and notify the patient, if needed.

## 2019-05-31 NOTE — PROGRESS NOTES
"Subjective     Chanel Cotto is a 29 year old female who presents to clinic today for the following health issues:    HPI   Medication Followup of ADHD/ Adderall    Taking Medication as prescribed: yes    Side Effects:  None    Medication Helping Symptoms:  Yes    Chanel is doing well with her medication daily without complications.         Reviewed and updated as needed this visit by provider:  Tobacco  Allergies  Meds  Problems  Med Hx  Surg Hx  Fam Hx         Review of Systems   Constitutional, HEENT, cardiovascular, pulmonary, GI, , musculoskeletal, neuro, skin, endocrine and psych systems are negative, except as otherwise noted.      Objective   /80   Pulse 107   Temp 98.2  F (36.8  C) (Oral)   Ht 1.746 m (5' 8.75\")   Wt 49.9 kg (110 lb)   SpO2 98%   BMI 16.36 kg/m   Body mass index is 16.36 kg/m .  Physical Exam   GENERAL: healthy, alert, well nourished, well hydrated, no distress  HENT: ear canals- normal; TMs- normal; Nose- normal; Mouth- no ulcers, no lesions  NECK: no tenderness, no adenopathy, no asymmetry, no masses, no stiffness; thyroid- normal to palpation  RESP: lungs clear to auscultation - no rales, no rhonchi, no wheezes  CV: regular rates and rhythm, normal S1 S2, no S3 or S4 and no murmur, no click or rub -  ABDOMEN: soft, no tenderness, no  hepatosplenomegaly, no masses, normal bowel sounds  PSYCH: Alert and oriented times 3; speech- coherent , normal rate and volume; able to articulate logical thoughts, able to abstract reason, no tangential thoughts, no hallucinations or delusions, affect- normal    Assessment & Plan     Chanel was seen today for recheck medication.    Diagnoses and all orders for this visit:    Attention deficit hyperactivity disorder (ADHD), combined type - CSA signed - 6/3/19 - appointments every 6 months - Controlled, refilled, continue medication.   -     amphetamine-dextroamphetamine (ADDERALL) 5 MG tablet; Take two tablets (10 mg) in the " morning, can take one tablet (5 mg) at least 4 hours later if needed.      See Patient Instructions    Return in about 6 months (around 12/3/2019) for Medication Recheck.    The information in this document, created by the medical scribe for me, accurately reflects the services I personally performed and the decisions made by me. I have reviewed and approved this document for accuracy prior to leaving the patient care area.  1:03 PM, 06/03/19        Andrew Sales MD   Pager - [phone]  [facility] [place]

## 2019-06-03 ENCOUNTER — OFFICE VISIT (OUTPATIENT)
Dept: FAMILY MEDICINE | Facility: CLINIC | Age: 29
End: 2019-06-03
Payer: COMMERCIAL

## 2019-06-03 VITALS
OXYGEN SATURATION: 98 % | BODY MASS INDEX: 16.29 KG/M2 | SYSTOLIC BLOOD PRESSURE: 100 MMHG | HEIGHT: 69 IN | HEART RATE: 107 BPM | WEIGHT: 110 LBS | DIASTOLIC BLOOD PRESSURE: 80 MMHG | TEMPERATURE: 98.2 F

## 2019-06-03 DIAGNOSIS — F90.2 ATTENTION DEFICIT HYPERACTIVITY DISORDER (ADHD), COMBINED TYPE: ICD-10-CM

## 2019-06-03 PROCEDURE — 99213 OFFICE O/P EST LOW 20 MIN: CPT | Performed by: FAMILY MEDICINE

## 2019-06-03 RX ORDER — DEXTROAMPHETAMINE SACCHARATE, AMPHETAMINE ASPARTATE, DEXTROAMPHETAMINE SULFATE AND AMPHETAMINE SULFATE 1.25; 1.25; 1.25; 1.25 MG/1; MG/1; MG/1; MG/1
TABLET ORAL
Qty: 90 TABLET | Refills: 0 | Status: SHIPPED | OUTPATIENT
Start: 2019-08-05 | End: 2019-09-05

## 2019-06-03 RX ORDER — DEXTROAMPHETAMINE SACCHARATE, AMPHETAMINE ASPARTATE, DEXTROAMPHETAMINE SULFATE AND AMPHETAMINE SULFATE 1.25; 1.25; 1.25; 1.25 MG/1; MG/1; MG/1; MG/1
TABLET ORAL
Qty: 90 TABLET | Refills: 0 | Status: SHIPPED | OUTPATIENT
Start: 2019-07-05 | End: 2019-06-03

## 2019-06-03 RX ORDER — DEXTROAMPHETAMINE SACCHARATE, AMPHETAMINE ASPARTATE, DEXTROAMPHETAMINE SULFATE AND AMPHETAMINE SULFATE 1.25; 1.25; 1.25; 1.25 MG/1; MG/1; MG/1; MG/1
TABLET ORAL
Qty: 90 TABLET | Refills: 0 | Status: SHIPPED | OUTPATIENT
Start: 2019-06-05 | End: 2019-06-03

## 2019-06-03 ASSESSMENT — MIFFLIN-ST. JEOR: SCORE: 1289.37

## 2019-06-03 NOTE — LETTER
[facility] [place]  06/03/19    Patient: Chanel Cotto  YOB: 1990  Medical Record Number: [id number]  CSN: [id number]                                                                              Non-opioid Controlled Substance Agreement    I understand that my care provider has prescribed a controlled substance to help manage my condition(s). I am taking this medicine to help me function or work. I know this is strong medicine, and that it can cause serious side effects. Controlled substances can be sedating, addicting and may cause a dependency on the drug. They can affect my ability to drive or think, and cause depression. They need to be taken exactly as prescribed. Combining controlled substances with certain medicines or chemicals (such as cocaine, sedatives and tranquilizers, sleeping pills, meth) can be dangerous or even fatal. Also, if I stop controlled substances suddenly, I may have severe withdrawal symptoms.  If not helpful, I may be asked to stop them.    The risks, benefits, and side effects of these medicine(s) were explained to me. I agree that:    1. I will take part in other treatments as advised by my care team. This may be psychiatry or counseling, physical therapy, behavioral therapy, group treatment or a referral to a pain clinic. I will reduce or stop my medicine when my care team tells me to do so.  2. I will take my medicines as prescribed. I will not change the dose or schedule unless my care team tells me to. There will be no refills if I  run out early.   I may be contactedwithout warning and asked to complete a urine drug test or pill count at any time.   3. I will keep all my appointments, and understand this is part of the monitoring of controlled substances. My care team may require an office visit for EVERY controlled substance refill. If I miss appointments or don t follow instructions, my care team may stop my medicine.  4. I will not ask other providers to  prescribe controlled substances, and I will not accept controlled substances from other people. If I need another prescribed controlled substance for a new reason, I will tell my care team within 1 business day.  5. I will use one pharmacy to fill all of my controlled substance prescriptions, and it is up to me to make sure that I do not run out of my medicines on weekends or holidays. If my care team is willing to refill my controlled substance prescription without a visit, I must request refills only during office hours, refills may take up to 3 days to process, and it may take up to 5 to 7 days for my medicine to be mailed and ready at my pharmacy. Prescriptions will not be mailed anywhere except my pharmacy.    6. I am responsible for my prescriptions. If the medicine/prescription is lost or stolen, it will not be replaced. I also agree not to share controlled substance medicines with anyone.                    [facility] [place]  06/03/19  Patient:  Chanel Cotto  YOB: 1990  Medical Record Number: [id number]  CSN: [id number]    7. I agree to not use ANY illegal or recreational drugs. This includes marijuana, cocaine, bath salts or other drugs. I agree not to use alcohol unless my care team says I may. I agree to give urine samples whenever asked. If I don t give a urine sample, the care team may stop my medicine.    8. If I enroll in the [place] Medical Marijuana program, I will tell my care team. I will also sign an agreement to share my medical records with my care team.    9. I will bring in my list of medicines (or my medicine bottles) each time I come to the clinic.   10. I will tell my care team right away if I become pregnant or have a new medical problem treated outside of my regular clinic.  11. I understand that this medicine can affect my thinking and judgment. It may be unsafe for me to drive, use machinery and do dangerous tasks. I will not do any of these things until I  know how the medicine affects me. If my dose changes, I will wait to see how it affects me. I will contact my care team if I have concerns about medicine side effects.    I understand that if I do not follow any of the conditions above, my prescriptions or treatment may be stopped.      I agree that my provider, clinic care team, and pharmacy may work with any city, state or federal law enforcement agency that investigates the misuse, sale, or other diversion of my controlled medicine. I will allow my provider to discuss my care with or share a copy of this agreement with any other treating provider, pharmacy or emergency room where I receive care. I agree to give up (waive) any right of privacy or confidentiality with respect to these consents.   I have read this agreement and have asked questions about anything I did not understand.    ____________________________________________________    ____________  ________  Patient signature                                                         Date      Time    ____________________________________________________     ____________  ________  Witness                                                          Date      Time    ____________________________________________________  Provider signature

## 2019-09-03 DIAGNOSIS — F90.2 ATTENTION DEFICIT HYPERACTIVITY DISORDER (ADHD), COMBINED TYPE: ICD-10-CM

## 2019-09-03 NOTE — TELEPHONE ENCOUNTER
Controlled Substance Refill Request for amphetamine-dextroamphetamine (ADDERALL) 5 MG tablet  Problem List Complete:    Yes    Last Written Prescription Date:  8.5.19  Last Fill Quantity: 90 tablet,   # refills: 0        Last Office Visit with [facility] primary care provider: 6.3.19    Future Office visit:   Next 5 appointments (look out 90 days)    Sep 05, 2019  4:20 PM CDT  Office Visit with Xu Sales MD  [facility] [address] [place] [id number] [phone]          Controlled substance agreement:   Encounter-Level CSA - 11/17/2017:    Controlled Substance Agreement - Scan on 12/1/2017 10:43 AM: CONTROLLED SUBSTANCE AGREEMENT (below)       Patient-Level CSA:    Controlled Substance Agreement - Non - Opioid - Scan on 6/13/2019  7:30 AM: NON-OPIOID CONTROLLED SUBSTANCE AGREEMENT (below)           Last Urine Drug Screen: No results found for: Valerie CHRISTIANSON Drug Analysis UR   Date Value Ref Range Status   03/08/2018 FINAL  Final     Comment:     (Note)  ====================================================================  COMPREHENSIVE DRUG ANALYSIS,UR  ====================================================================  Test                             Result       Flag       Units        Drug Present   Amphetamine                    342                     ng/mg creat    Amphetamine is available as a schedule II prescription drug.  ====================================================================  Test                      Result    Flag   Units      Ref Range        Creatinine              145              mg/dL      >=20            ====================================================================  For clinical consultation, please call [phone].  ====================================================================  Analysis performed by [employer] [place] [address]     , No results found for: THC13, PCP13,  COC13, MAMP13, OPI13, AMP13, BZO13, TCA13, MTD13, BAR13, OXY13, PPX13, BUP13     Processing:  Fax Rx to listed pharmacy    [email or web address]   checked in past 3 months?  No, route to RN

## 2019-09-03 NOTE — TELEPHONE ENCOUNTER
Reason for Call:  Medication or medication refill:    Do you use a [facility]?  Name of the pharmacy and phone number for the current request: [facility] [place] [address] [place] [address]     Name of the medication requested: amphetamine-dextroamphetamine (ADDERALL) 5 MG tablet    Other request: The patient sees Dr. Sales on 09/05/2019. She is asking for a couple of Adderall to get her to this appointment.    Can we leave a detailed message on this number? YES    Phone number patient can be reached at: Cell number on file:    Telephone Information:   Mobile [phone]     Best Time: Anytime    Call taken on 9/3/2019 at 3:11 PM by Bernie Arora

## 2019-09-04 NOTE — TELEPHONE ENCOUNTER
Routing refill request to provider for review/approval because:  Drug not on the [facility] refill protocol     Ailyn Castillo RN, BSN  [facility]

## 2019-09-05 RX ORDER — DEXTROAMPHETAMINE SACCHARATE, AMPHETAMINE ASPARTATE, DEXTROAMPHETAMINE SULFATE AND AMPHETAMINE SULFATE 1.25; 1.25; 1.25; 1.25 MG/1; MG/1; MG/1; MG/1
TABLET ORAL
Qty: 90 TABLET | Refills: 0 | Status: SHIPPED | OUTPATIENT
Start: 2019-09-05 | End: 2020-01-16

## 2019-09-09 ENCOUNTER — TELEPHONE (OUTPATIENT)
Dept: FAMILY MEDICINE | Facility: CLINIC | Age: 29
End: 2019-09-09

## 2019-09-09 NOTE — TELEPHONE ENCOUNTER
Patient has a 4:20 appointment today, not needed.   Notes state to come back in 6 months from her June appt.      Please let patient know unless she has symptoms no need to be seen this afternoon.      Please schedule for December-- which will be time for a 6 month med check.       Heather Vidal

## 2019-10-16 PROCEDURE — 93005 ELECTROCARDIOGRAM TRACING: CPT | Performed by: EMERGENCY MEDICINE

## 2019-10-16 PROCEDURE — 93010 ELECTROCARDIOGRAM REPORT: CPT | Mod: Z6 | Performed by: EMERGENCY MEDICINE

## 2019-10-16 PROCEDURE — 99285 EMERGENCY DEPT VISIT HI MDM: CPT | Mod: 25 | Performed by: EMERGENCY MEDICINE

## 2019-10-16 ASSESSMENT — MIFFLIN-ST. JEOR: SCORE: 1241.63

## 2019-10-17 ENCOUNTER — HOSPITAL ENCOUNTER (EMERGENCY)
Facility: CLINIC | Age: 29
Discharge: HOME | End: 2019-10-17
Attending: EMERGENCY MEDICINE | Admitting: EMERGENCY MEDICINE
Payer: COMMERCIAL

## 2019-10-17 ENCOUNTER — APPOINTMENT (OUTPATIENT)
Dept: GENERAL RADIOLOGY | Facility: CLINIC | Age: 29
End: 2019-10-17
Attending: EMERGENCY MEDICINE
Payer: COMMERCIAL

## 2019-10-17 VITALS
OXYGEN SATURATION: 100 % | WEIGHT: 105.6 LBS | BODY MASS INDEX: 16.57 KG/M2 | HEIGHT: 67 IN | TEMPERATURE: 98.5 F | RESPIRATION RATE: 18 BRPM | DIASTOLIC BLOOD PRESSURE: 78 MMHG | HEART RATE: 65 BPM | SYSTOLIC BLOOD PRESSURE: 110 MMHG

## 2019-10-17 DIAGNOSIS — R07.9 ACUTE CHEST PAIN: ICD-10-CM

## 2019-10-17 DIAGNOSIS — G58.9 MONONEUROPATHY: ICD-10-CM

## 2019-10-17 DIAGNOSIS — M94.0 COSTOCHONDRITIS: ICD-10-CM

## 2019-10-17 LAB — INTERPRETATION ECG - MUSE: NORMAL

## 2019-10-17 PROCEDURE — 71046 X-RAY EXAM CHEST 2 VIEWS: CPT

## 2019-10-17 RX ORDER — IBUPROFEN 600 MG/1
600 TABLET, FILM COATED ORAL EVERY 6 HOURS PRN
Qty: 30 TABLET | Refills: 0 | Status: SHIPPED | OUTPATIENT
Start: 2019-10-17 | End: 2021-04-30

## 2019-10-17 RX ORDER — FAMOTIDINE 20 MG/1
20 TABLET, FILM COATED ORAL 2 TIMES DAILY PRN
Qty: 30 TABLET | Refills: 0 | Status: SHIPPED | OUTPATIENT
Start: 2019-10-17 | End: 2021-04-30

## 2019-10-17 NOTE — ED TRIAGE NOTES
Pt. BIBA from home for chest pain at rest, pinpoint on L side, no other sx  HX anorexia @ treatment center  AVSS on RA, EKG WNL

## 2019-10-17 NOTE — ED PROVIDER NOTES
History   No chief complaint on file.    HPI  Chanel Cotto is a 29 year old female with a past medical history of anorexia, PTSD, ADHD, GERD, and irregular menses who presents to the emergency department with chief complaint of chest pain.  It occurred at rest.  It is located on the left side of her chest.  It is pinpoint.  It is worse with deep breaths.  Patient denies any other associated symptoms.  Patient was brought in by ambulance from treatment center where she is undergoing treatment for anorexia.  Patient states that they advanced her diet today, she thinks that this may have something to do with the worsening symptoms.  She denies any associated GI symptoms such as nausea or abdominal pain.  The patient is not on any hormonal contraceptive therapy.  Heart rate is 65.  No hematemesis.  No recent travel.  Oxygen saturation is normal.  Patient denies any personal or family history of blood clots.    Of note, patient states she has experienced tingling sensation in her anterior right leg, this is been going on for some time and she has discussed this with the doctor at her anorexia treatment facility.  They are monitoring this, however the physician told her she may need to follow-up with neurology if this persists.  The patient complains of some mild weakness of the affected extremity as well.  She is still able to ambulate.  No posterior leg tenderness or leg swelling.    I have reviewed the Medications, Allergies, Past Medical and Surgical History, and Social History in the Epic system.    Review of Systems    General: No fevers or chills  Skin: No rash or diaphoresis  Eyes: No eye redness or discharge  Ears/Nose/Throat: No rhinorrhea or nasal congestion  Respiratory: No cough or SOB  Cardiovascular: Positive for chest pain, no palpitations  Gastrointestinal: No nausea, vomiting, or diarrhea  Genitourinary: No urinary frequency, hematuria, or dysuria  Musculoskeletal: No arthralgias or  "myalgias  Neurologic: No numbness or weakness  Psychiatric: No depression or SI  Hematologic/Lymphatic/Immunologic: No leg swelling, no easy bruising/bleeding  Endocrine: No polyuria/polydypsia     Physical Exam   BP: 106/68  Pulse: 65  Temp: 98.5  F (36.9  C)  Resp: 18  Height: 170.2 cm (5' 7\")  Weight: 47.9 kg (105 lb 9.6 oz)  SpO2: 100 %      Physical Exam    General: Well nourished, well developed, NAD  HEENT: EOMI, anicteric. NCAT, MMM  Neck: no jugular venous distension, supple, nl ROM  Cardiac: Regular rate and rhythm. No murmurs, rubs, or gallops. Normal S1, S2.  Intact peripheral pulses.  Point chest wall tenderness adjacent to the left side of sternum  Pulm: CTAB, no stridor, wheezes, rales, rhonchi  Skin: Warm and dry to the touch.  No rash  Extremities: No LE edema, no cyanosis, w/w/p  Neuro: A&Ox3, no gross focal deficits       ED Course        Procedures             EKG Interpretation:      Interpreted by Haily Ambrosio MD  Time reviewed: 213  Symptoms at time of EKG: Chest pain  Rhythm: Sinus bradycardia  Rate: Bradycardic, 50 bpm  Axis: normal  Ectopy: none  Conduction: normal  ST Segments/ T Waves: No ST-T wave changes  Q Waves: none  Comparison to prior: No old EKG available    Clinical Impression: abnormal EKG due to rate only          Critical Care time:  none             Labs Ordered and Resulted from Time of ED Arrival Up to the Time of Departure from the ED - No data to display       Results for orders placed or performed during the hospital encounter of 10/17/19 (from the past 24 hour(s))   EKG 12 lead   Result Value Ref Range    Interpretation ECG Click View Image link to view waveform and result    XR Chest 2 Views    Narrative     Exam: XR CHEST 2 VW, 10/17/2019 1:25 AM    Indication: chest pain    Comparison: None    Findings:   PA and lateral views the chest. Lung volumes and cardiac silhouette  are normal. Pulmonary vasculature is distinct. No focal opacity,  pleural effusion, or " pneumothorax. Upper abdomen is unremarkable. No  displaced rib fractures.      Impression    Impression: No acute cardiopulmonary abnormality.    I have personally reviewed the examination and initial interpretation  and I agree with the findings.    JOE DING MD       Labs and imaging studies were reviewed by me.    Assessments & Plan (with Medical Decision Making)   The patient is a 29-year-old female presents the emergency room with chief complaint of chest pain.  Differential diagnosis includes musculoskeletal chest wall pain (costochondritis is likely given location of pain and patient's exam), ACS is less likely. PE also less likely as pt is PERC megative.    The patient also complains of right leg paresthesias.  This is not an acute problem for the patient.  This may be secondary to vitamin deficiency given patient's history of anorexia.  Patient's physician is aware of this finding.  Patient provided with neurology referral for follow-up.    EKG is normal and chest x-ray shows no acute disease process.    I have reviewed the nursing notes.    I have reviewed the findings, diagnosis, plan and need for follow up with the patient.  Patient to be discharged home. Advised to follow up with PCP within 1 week.  Patient also provided with neurology for referral.  To return to ER immediately with any new/worsening symptoms. Plan of care discussed with patient and their family. They agree with plan of care.  Patient provided with medications for symptomatic relief at home.    Discharge Medication List as of 10/17/2019  2:54 AM      START taking these medications    Details   famotidine (PEPCID) 20 MG tablet Take 1 tablet (20 mg) by mouth 2 times daily as needed, Disp-30 tablet, R-0, Local Print      ibuprofen (ADVIL/MOTRIN) 600 MG tablet Take 1 tablet (600 mg) by mouth every 6 hours as needed, Disp-30 tablet, R-0, Local Print             Final diagnoses:   Acute chest pain   Costochondritis   Mononeuropathy        10/16/2019   [facility], [place], EMERGENCY DEPARTMENT     Haily Ambrosio MD  10/17/19 0737

## 2019-10-17 NOTE — ED AVS SNAPSHOT
[facility], [place], Emergency Department  [address] [place] [id number]  Phone:  [phone]                                    Chanel Cotto   MRN: [id number]    Department:  [facility], Emergency Department   Date of Visit:  10/16/2019           After Visit Summary Signature Page    I have received my discharge instructions, and my questions have been answered. I have discussed any challenges I see with this plan with the nurse or doctor.    ..........................................................................................................................................  Patient/Patient Representative Signature      ..........................................................................................................................................  Patient Representative Print Name and Relationship to Patient    ..................................................               ................................................  Date                                   Time    ..........................................................................................................................................  Reviewed by Signature/Title    ...................................................              ..............................................  Date                                               Time          22EPIC Rev 08/18

## 2019-10-17 NOTE — DISCHARGE INSTRUCTIONS
TODAY'S VISIT:  You were seen today for chest pain, right leg neuropathy  -   - If you had any labs or imaging/radiology tests performed today, you should also discuss these tests with your usual provider.     FOLLOW-UP:  Please make an appointment to follow up with:  - Your Primary Care Provider and Neurology Clinic (phone: [phone])     - Have your provider review the results from today's visit with you again to make sure no further follow-up or additional testing is needed based on those results.     PRESCRIPTIONS / MEDICATIONS:  -    OTHER INSTRUCTIONS:  - Do your best to stay hydrated.    RETURN TO THE EMERGENCY DEPARTMENT  Return to the Emergency Department at any time for any new or worsening symptoms or any concerns.

## 2019-10-29 ENCOUNTER — PRE VISIT (OUTPATIENT)
Facility: CLINIC | Age: 29
End: 2019-10-29

## 2019-10-29 NOTE — TELEPHONE ENCOUNTER
FUTURE VISIT INFORMATION      FUTURE VISIT INFORMATION:    Date: 11/29/2019    Time: 830AM    Location: [facility] [place] [facility]  REFERRAL INFORMATION:    Referring provider:  Self     Referring providers clinic:      Reason for visit/diagnosis  Leg and Toe Numbness     RECORDS REQUESTED FROM:       Clinic name Comments Records Status Imaging Status   [facility]  Care Everywhere Requested to PACS

## 2019-11-06 ENCOUNTER — HEALTH MAINTENANCE LETTER (OUTPATIENT)
Age: 29
End: 2019-11-06

## 2020-01-15 DIAGNOSIS — F90.2 ATTENTION DEFICIT HYPERACTIVITY DISORDER (ADHD), COMBINED TYPE: ICD-10-CM

## 2020-01-15 NOTE — TELEPHONE ENCOUNTER
Outpatient Medication Detail      Disp Refills Start End YRIS   amphetamine-dextroamphetamine (ADDERALL) 5 MG tablet 90 tablet 0 9/5/2019  No   Sig: Take two tablets (10 mg) in the morning, can take one tablet (5 mg) at least 4 hours later if needed.     Problem List Complete:    Yes    Last Office Visit with [facility] [place] [place] primary care provider: 06/03/2019    Future Office visit:     Controlled substance agreement:   Encounter-Level CSA - 11/17/2017:    Controlled Substance Agreement - Scan on 12/1/2017 10:43 AM: CONTROLLED SUBSTANCE AGREEMENT     Patient-Level CSA:    Controlled Substance Agreement - Non - Opioid - Scan on 6/13/2019  7:30 AM: NON-OPIOID CONTROLLED SUBSTANCE AGREEMENT        Patient is followed by YULISA DEL REAL for ongoing prescription of stimulants.  All refills should be approved by this provider, or covering partner.     Medication(s): Adderall 5 mg TID prn.   Maximum quantity per month: 90  Clinic visit frequency required: Q 6  months      Controlled substance agreement on file: Yes       Date(s): 12/17/17  Neuropsych evaluation for ADD completed:       Last [facility] website verification:  done on 11/29/18   [email or web address]    Last Urine Drug Screen: No results found for: Valerie CHRISTIANSON Drug Analysis UR   Date Value Ref Range Status   03/08/2018 FINAL  Final     Comment:     (Note)  ====================================================================  COMPREHENSIVE DRUG ANALYSIS,UR  ====================================================================  Test                             Result       Flag       Units        Drug Present   Amphetamine                    342                     ng/mg creat    Amphetamine is available as a schedule II prescription drug.  ====================================================================  Test                      Result    Flag   Units      Ref Range        Creatinine              145              mg/dL      >=20             ====================================================================  For clinical consultation, please call [phone].  ====================================================================  Analysis performed by [employer] [place] [address]     , No results found for: THC13, PCP13, COC13, MAMP13, OPI13, AMP13, BZO13, TCA13, MTD13, BAR13, OXY13, PPX13, BUP13       [email or web address]      Routing refill request to provider for review/approval because:  Drug not on the [facility] refill protocol         Yoli Zhao RN  [facility]   Hydroxyzine Counseling: Patient advised that the medication is sedating and not to drive a car after taking this medication.  Patient informed of potential adverse effects including but not limited to dry mouth, urinary retention, and blurry vision.  The patient verbalized understanding of the proper use and possible adverse effects of hydroxyzine.  All of the patient's questions and concerns were addressed.

## 2020-01-16 RX ORDER — DEXTROAMPHETAMINE SACCHARATE, AMPHETAMINE ASPARTATE, DEXTROAMPHETAMINE SULFATE AND AMPHETAMINE SULFATE 1.25; 1.25; 1.25; 1.25 MG/1; MG/1; MG/1; MG/1
TABLET ORAL
Qty: 90 TABLET | Refills: 0 | Status: SHIPPED | OUTPATIENT
Start: 2020-01-16 | End: 2020-08-10

## 2020-01-16 NOTE — TELEPHONE ENCOUNTER
~ 6 months since last followup appointment. - needs to make an appointment prior to next refill request. The prescription(s) has been sent to the requested pharmacy.  Please notify the patient if needed.

## 2020-04-15 ENCOUNTER — TELEPHONE (OUTPATIENT)
Dept: FAMILY MEDICINE | Facility: CLINIC | Age: 30
End: 2020-04-15

## 2020-04-15 ENCOUNTER — COMMUNICATION - HEALTHEAST (OUTPATIENT)
Dept: SCHEDULING | Facility: CLINIC | Age: 30
End: 2020-04-15

## 2020-04-15 NOTE — TELEPHONE ENCOUNTER
[facility] phone call message- general phone call:    Reason for call: Pt is currently not our patient but would like to become one.  She has an eating disorder and is in out patient treatment.  Mom is calling because Chanel needs vitals and weight in order to keep her enrolled in the program.  However, I referred her to [facility] because she wanted to know if she could be tested for Covid 19.  Chanel had extreme fatigue and cough for about two weeks.    Return call needed: Yes    OK to leave a message on voice mail? Yes    Primary language: English      needed? No    Call taken on April 15, 2020 at 10:54 AM by Kita Valdes

## 2020-04-15 NOTE — TELEPHONE ENCOUNTER
[facility].     At this point, testing is still limited to those in the hospital, health care workers, and those on organ transplant lists.     We are accepting new patients at this time, but will have to discuss with mother how often she needs to come in and see if there are options to monitor at home such as home BP cuff. Given COVID19 pandemic, it is safest to keep patient out of clinic as much as possible. ./LR

## 2020-04-16 NOTE — TELEPHONE ENCOUNTER
Per care everywhere, patient has telephone visit with [facility] provider today. Will not attempt further outreach. ./LR

## 2020-04-23 ENCOUNTER — TRANSFERRED RECORDS (OUTPATIENT)
Dept: MULTI SPECIALTY CLINIC | Facility: CLINIC | Age: 30
End: 2020-04-23

## 2020-04-23 LAB — PAP SMEAR - HIM PATIENT REPORTED: NEGATIVE

## 2020-07-08 ENCOUNTER — OFFICE VISIT (OUTPATIENT)
Dept: INTERNAL MEDICINE | Facility: CLINIC | Age: 30
End: 2020-07-08
Payer: COMMERCIAL

## 2020-07-08 VITALS
DIASTOLIC BLOOD PRESSURE: 72 MMHG | HEART RATE: 79 BPM | OXYGEN SATURATION: 98 % | SYSTOLIC BLOOD PRESSURE: 118 MMHG | TEMPERATURE: 98 F

## 2020-07-08 DIAGNOSIS — F50.9 EATING DISORDER, UNSPECIFIED TYPE: Primary | ICD-10-CM

## 2020-07-08 LAB
ALBUMIN SERPL-MCNC: 3.5 G/DL (ref 3.4–5)
ALP SERPL-CCNC: 89 U/L (ref 40–150)
ALT SERPL W P-5'-P-CCNC: 36 U/L (ref 0–50)
ANION GAP SERPL CALCULATED.3IONS-SCNC: 6 MMOL/L (ref 3–14)
AST SERPL W P-5'-P-CCNC: 61 U/L (ref 0–45)
BILIRUB SERPL-MCNC: 0.4 MG/DL (ref 0.2–1.3)
BUN SERPL-MCNC: 8 MG/DL (ref 7–30)
CALCIUM SERPL-MCNC: 8.6 MG/DL (ref 8.5–10.1)
CHLORIDE SERPL-SCNC: 105 MMOL/L (ref 94–109)
CO2 SERPL-SCNC: 28 MMOL/L (ref 20–32)
CREAT SERPL-MCNC: 0.59 MG/DL (ref 0.52–1.04)
ERYTHROCYTE [DISTWIDTH] IN BLOOD BY AUTOMATED COUNT: 14.3 % (ref 10–15)
GFR SERPL CREATININE-BSD FRML MDRD: >90 ML/MIN/{1.73_M2}
GLUCOSE SERPL-MCNC: 81 MG/DL (ref 70–99)
HCT VFR BLD AUTO: 40.8 % (ref 35–47)
HGB BLD-MCNC: 13.9 G/DL (ref 11.7–15.7)
MAGNESIUM SERPL-MCNC: 2.1 MG/DL (ref 1.6–2.3)
MCH RBC QN AUTO: 33.7 PG (ref 26.5–33)
MCHC RBC AUTO-ENTMCNC: 34.1 G/DL (ref 31.5–36.5)
MCV RBC AUTO: 99 FL (ref 78–100)
PHOSPHATE SERPL-MCNC: 4.6 MG/DL (ref 2.5–4.5)
PLATELET # BLD AUTO: 134 10E9/L (ref 150–450)
POTASSIUM SERPL-SCNC: 3.5 MMOL/L (ref 3.4–5.3)
PROT SERPL-MCNC: 6.9 G/DL (ref 6.8–8.8)
RBC # BLD AUTO: 4.13 10E12/L (ref 3.8–5.2)
SODIUM SERPL-SCNC: 139 MMOL/L (ref 133–144)
WBC # BLD AUTO: 3.2 10E9/L (ref 4–11)

## 2020-07-08 PROCEDURE — 84100 ASSAY OF PHOSPHORUS: CPT | Performed by: INTERNAL MEDICINE

## 2020-07-08 PROCEDURE — 83735 ASSAY OF MAGNESIUM: CPT | Performed by: INTERNAL MEDICINE

## 2020-07-08 PROCEDURE — 85027 COMPLETE CBC AUTOMATED: CPT | Performed by: INTERNAL MEDICINE

## 2020-07-08 PROCEDURE — 36415 COLL VENOUS BLD VENIPUNCTURE: CPT | Performed by: INTERNAL MEDICINE

## 2020-07-08 PROCEDURE — 80053 COMPREHEN METABOLIC PANEL: CPT | Performed by: INTERNAL MEDICINE

## 2020-07-08 PROCEDURE — 99214 OFFICE O/P EST MOD 30 MIN: CPT | Performed by: INTERNAL MEDICINE

## 2020-07-08 PROCEDURE — 82306 VITAMIN D 25 HYDROXY: CPT | Performed by: INTERNAL MEDICINE
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
## 2020-07-09 LAB — DEPRECATED CALCIDIOL+CALCIFEROL SERPL-MC: 39 UG/L (ref 20–75)

## 2020-07-28 ENCOUNTER — TELEPHONE (OUTPATIENT)
Dept: FAMILY MEDICINE | Facility: CLINIC | Age: 30
End: 2020-07-28

## 2020-07-28 DIAGNOSIS — F90.2 ATTENTION DEFICIT HYPERACTIVITY DISORDER (ADHD), COMBINED TYPE: ICD-10-CM

## 2020-07-28 NOTE — TELEPHONE ENCOUNTER
Controlled Substance Refill Request for amphetamine-dextroamphetamine (ADDERALL) 5 MG tablet   Problem List Complete:    Yes    Last Written Prescription Date:  1-16-20  Last Fill Quantity: 90 tablet,   # refills: 0      Last Office Visit with [facility] primary care provider: 1-28-20    Future Office visit:     Controlled substance agreement:   Encounter-Level CSA - 11/17/2017:    Controlled Substance Agreement - Scan on 12/1/2017 10:43 AM: CONTROLLED SUBSTANCE AGREEMENT     Patient-Level CSA:    Controlled Substance Agreement - Non - Opioid - Scan on 6/13/2019  7:30 AM: NON-OPIOID CONTROLLED SUBSTANCE AGREEMENT         Last Urine Drug Screen: No results found for: CDAUT,   Comprehen Drug Analysis UR   Date Value Ref Range Status   03/08/2018 FINAL  Final     Comment:     (Note)  ====================================================================  COMPREHENSIVE DRUG ANALYSIS,UR  ====================================================================  Test                             Result       Flag       Units        Drug Present   Amphetamine                    342                     ng/mg creat    Amphetamine is available as a schedule II prescription drug.  ====================================================================  Test                      Result    Flag   Units      Ref Range        Creatinine              145              mg/dL      >=20            ====================================================================  For clinical consultation, please call [phone].  ====================================================================  Analysis performed by [employer], [email or web address] [place] [address]     , No results found for: THC13, PCP13, COC13, MAMP13, OPI13, AMP13, BZO13, TCA13, MTD13, BAR13, OXY13, PPX13, BUP13     Processing:  Fax Rx to listed pharmacy    [email or web address]   checked in past 3 months?  No, route to RN

## 2020-07-29 ENCOUNTER — MYC REFILL (OUTPATIENT)
Dept: FAMILY MEDICINE | Facility: CLINIC | Age: 30
End: 2020-07-29

## 2020-07-29 DIAGNOSIS — F90.2 ATTENTION DEFICIT HYPERACTIVITY DISORDER (ADHD), COMBINED TYPE: ICD-10-CM

## 2020-07-29 RX ORDER — DEXTROAMPHETAMINE SACCHARATE, AMPHETAMINE ASPARTATE, DEXTROAMPHETAMINE SULFATE AND AMPHETAMINE SULFATE 1.25; 1.25; 1.25; 1.25 MG/1; MG/1; MG/1; MG/1
TABLET ORAL
Qty: 90 TABLET | Refills: 0 | OUTPATIENT
Start: 2020-07-29

## 2020-07-29 NOTE — TELEPHONE ENCOUNTER
[place] reviewed:  reviewed- recommend provider review    [place]:  [email or web address]      Looks like patient has been getting refills from a different provider- sent info to pharmacy

## 2020-07-30 RX ORDER — DEXTROAMPHETAMINE SACCHARATE, AMPHETAMINE ASPARTATE, DEXTROAMPHETAMINE SULFATE AND AMPHETAMINE SULFATE 1.25; 1.25; 1.25; 1.25 MG/1; MG/1; MG/1; MG/1
TABLET ORAL
Qty: 90 TABLET | Refills: 0 | OUTPATIENT
Start: 2020-07-30

## 2020-07-30 NOTE — TELEPHONE ENCOUNTER
Outpatient Medication Detail      Disp  Refills  Start  End  YRIS    amphetamine-dextroamphetamine (ADDERALL) 5 MG tablet  90 tablet  0  1/16/2020   No    Sig: TAKE TWO TABLETS BY MOUTH EVERY MORNING (CAN TAKE ONE TABLET AT LEAST 4 HOURS LATER IF NEEDED).      Problem List Complete:    Yes    Last Office Visit with [facility] primary care provider: 6/3/19    Future Office visit:     Controlled substance agreement:   Encounter-Level CSA - 11/17/2017:    Controlled Substance Agreement - Scan on 12/1/2017 10:43 AM: CONTROLLED SUBSTANCE AGREEMENT     Patient-Level CSA:    Controlled Substance Agreement - Non - Opioid - Scan on 6/13/2019  7:30 AM: NON-OPIOID CONTROLLED SUBSTANCE AGREEMENT       Last Urine Drug Screen: No results found for: Valerie CHRISTIANSON Drug Analysis UR   Date Value Ref Range Status   03/08/2018 FINAL  Final     Comment:     (Note)  ====================================================================  COMPREHENSIVE DRUG ANALYSIS,UR  ====================================================================  Test                             Result       Flag       Units        Drug Present   Amphetamine                    342                     ng/mg creat    Amphetamine is available as a schedule II prescription drug.  ====================================================================  Test                      Result    Flag   Units      Ref Range        Creatinine              145              mg/dL      >=20            ====================================================================  For clinical consultation, please call [phone].  ====================================================================  Analysis performed by [employer] [place] [address]     , No results found for: THC13, PCP13, COC13, MAMP13, OPI13, AMP13, BZO13, TCA13, MTD13, BAR13, OXY13, PPX13, BUP13      [email or web address]    Routing refill request to provider for review/approval because:  Drug  not on the [facility] refill protocol         Yoli Zhao RN  [facility]

## 2020-07-30 NOTE — TELEPHONE ENCOUNTER
Chanel calling for prescription refill. Informed of 3 business day policy. Please call the Pt back when possible. Thank you!    Can be reached at [phone].

## 2020-07-31 NOTE — TELEPHONE ENCOUNTER
Chanel calling saying prescription refill has been denied. Checked encounters and noted she has an appointment scheduled with Dr. Sales for 8/24 and he has been notified. Informed Pt it may take a few more days before refill is authorized.

## 2020-08-04 NOTE — TELEPHONE ENCOUNTER
Per providers note below: She is much overdue for a med recheck appointment and this has been declined until seen (video visit okay)    Patient has appointment scheduled for 8/24/2020,  patient must wait until seen since patient has NO SHOWED the past 3 appointments with provider?    DAVE Kaur, RN  [facility]

## 2020-08-04 NOTE — TELEPHONE ENCOUNTER
Patient calling again for her refill , she is out . Please fill. Patient has an appointment set already.  Please call to let her know when she can get this filled.  Melanai Morales, Clinic Receptionist

## 2020-08-04 NOTE — TELEPHONE ENCOUNTER
Patient made aware of 24/7 emergency services. Routing to team to see if patient would like to be seen sooner. Please see note below by provider.    My chart message sent no refills until seen.    BONNY KaurN, RN  [facility]

## 2020-08-05 ENCOUNTER — MYC MEDICAL ADVICE (OUTPATIENT)
Dept: FAMILY MEDICINE | Facility: CLINIC | Age: 30
End: 2020-08-05

## 2020-08-05 DIAGNOSIS — F90.2 ATTENTION DEFICIT HYPERACTIVITY DISORDER (ADHD), COMBINED TYPE: ICD-10-CM

## 2020-08-05 NOTE — TELEPHONE ENCOUNTER
Left non-detailed message for patient to call back.  Please schedule med check for week of 8/10 and cancel 8/24 appt if patient is able .  (see previous notes for details)    Thanks Heather

## 2020-08-06 NOTE — TELEPHONE ENCOUNTER
Per last encounter:  Xu Sales MD 2:32 PM   Note      I have openings next week to be seen sooner if needed.  No refills until seen.          Name: Chanel Cotto MRN: [id number]   Date: 8/10/2020 Status: Scheduled   Time: 4:40 PM Length: 40   Visit Type: TELEPHONE VISIT LONG [7488] Copay: $0.00   Provider: Xu Sales MD           [employer] message sent to update        Controlled Substance Refill Request for   amphetamine-dextroamphetamine (ADDERALL) 5 MG tablet  90 tablet  0  1/16/2020        Problem List Complete:    Yes    Last Written Prescription Date:  1/16/2020  Last Fill Quantity: 90,   # refills: 0  Last Office Visit with [facility] primary care provider: 9/5/2019    Future Office visit:       Controlled substance agreement:   Encounter-Level CSA - 11/17/2017:    Controlled Substance Agreement - Scan on 12/1/2017 10:43 AM: CONTROLLED SUBSTANCE AGREEMENT     Patient-Level CSA:    Controlled Substance Agreement - Non - Opioid - Scan on 6/13/2019  7:30 AM: NON-OPIOID CONTROLLED SUBSTANCE AGREEMENT         Last Urine Drug Screen: No results found for: Valerie CHRISTIANSON Drug Analysis UR   Date Value Ref Range Status   03/08/2018 FINAL  Final     Comment:     (Note)  ====================================================================  COMPREHENSIVE DRUG ANALYSIS,UR  ====================================================================  Test                             Result       Flag       Units        Drug Present   Amphetamine                    342                     ng/mg creat    Amphetamine is available as a schedule II prescription drug.  ====================================================================  Test                      Result    Flag   Units      Ref Range        Creatinine              145              mg/dL      >=20            ====================================================================  For clinical consultation, please call [phone].  ====================================================================  Analysis performed by [employer] [place] [address]     , No results found for: THC13, PCP13, COC13, MAMP13, OPI13, AMP13, BZO13, TCA13, MTD13, BAR13, OXY13, PPX13, BUP13     Processing:  Rx to be electronically transmitted to pharmacy by provider     [email or web address]   checked in past 3 months?  No, route to RN

## 2020-08-10 ENCOUNTER — VIRTUAL VISIT (OUTPATIENT)
Dept: FAMILY MEDICINE | Facility: CLINIC | Age: 30
End: 2020-08-10
Payer: COMMERCIAL

## 2020-08-10 VITALS — WEIGHT: 117 LBS | BODY MASS INDEX: 18.32 KG/M2

## 2020-08-10 DIAGNOSIS — F90.2 ATTENTION DEFICIT HYPERACTIVITY DISORDER (ADHD), COMBINED TYPE: ICD-10-CM

## 2020-08-10 PROCEDURE — 99213 OFFICE O/P EST LOW 20 MIN: CPT | Mod: 95 | Performed by: FAMILY MEDICINE

## 2020-08-10 RX ORDER — DEXTROAMPHETAMINE SACCHARATE, AMPHETAMINE ASPARTATE, DEXTROAMPHETAMINE SULFATE AND AMPHETAMINE SULFATE 1.25; 1.25; 1.25; 1.25 MG/1; MG/1; MG/1; MG/1
TABLET ORAL
Qty: 90 TABLET | Refills: 0 | Status: SHIPPED | OUTPATIENT
Start: 2020-08-10 | End: 2020-09-09

## 2020-08-10 NOTE — PROGRESS NOTES
"Subjective   Chanel Cotto is a 30 year old female who is being evaluated via a billable telephone visit.      The patient has been notified of following:     \"This telephone visit will be conducted via a call between you and your physician/provider. We have found that certain health care needs can be provided without the need for a physical exam.  This service lets us provide the care you need with a short phone conversation.  If a prescription is necessary we can send it directly to your pharmacy.  If lab work is needed we can place an order for that and you can then stop by our lab to have the test done at a later time.    Telephone visits are billed at different rates depending on your insurance coverage. During this emergency period, for some insurers they may be billed the same as an in-person visit.  Please reach out to your insurance provider with any questions.    If during the course of the call the physician/provider feels a telephone visit is not appropriate, you will not be charged for this service.\"     Patient has given verbal consent for Telephone visit?  Yes    How would you like to obtain your AVS? MyChart    Chanel Cotto is a 30 year old female who presents today for the following health issues:    Chief Complaint  Patient presents with:  Recheck Medication     Medication Followup of Adderall    Taking Medication as prescribed: yes    Side Effects:  None    Medication Helping Symptoms:  yes    She was treated for an eating disorder - Oct 2019 to Jan 2020  Reviewed and updated as needed this visit by Provider  Tobacco  Allergies  Meds  Problems  Med Hx  Surg Hx  Fam Hx       ROS: Constitutional, HEENT, cardiovascular, pulmonary, GI, , musculoskeletal, neuro, skin, endocrine and psych systems are negative, except as otherwise noted.     SH-lives a [facility] and feels supported.  Objective   Reported vitals:  Wt 53.1 kg (117 lb)   BMI 18.32 kg/m     Gen: healthy, alert, " and no distress  Psych: Alert and oriented times 3; coherent speech, normal   rate and volume, able to articulate logical thoughts, able   to abstract reason, no tangential thoughts, no hallucinations   or delusions.  Her affect is normal.    Diagnostic Test Results:  Labs reviewed in Epic        Assessment/Plan:  Attention deficit hyperactivity disorder (ADHD), combined type  - appointments every 6 months  She is doing okay and finds the medication helpful to get things done and participate in groups.  Her eating disorder is doing better and is hopeful.   - amphetamine-dextroamphetamine (ADDERALL) 5 MG tablet  Dispense: 90 tablet; Refill: 0      Return in about 6 months (around 2/10/2021) for Medication Recheck.    Phone call duration:  12 minutes        Andrew Sales MD     [address] [address]  [email or web address]   Office: [phone]  Pager: [phone]

## 2020-08-10 NOTE — LETTER
[facility] [place]  08/10/20    Patient: Chanel Cotto  YOB: 1990  Medical Record Number: [id number]  CSN: [id number]                                                                              Non-opioid Controlled Substance Agreement    I understand that my care provider has prescribed a controlled substance to help manage my condition(s). I am taking this medicine to help me function or work. I know this is strong medicine, and that it can cause serious side effects. Controlled substances can be sedating, addicting and may cause a dependency on the drug. They can affect my ability to drive or think, and cause depression. They need to be taken exactly as prescribed. Combining controlled substances with certain medicines or chemicals (such as cocaine, sedatives and tranquilizers, sleeping pills, meth) can be dangerous or even fatal. Also, if I stop controlled substances suddenly, I may have severe withdrawal symptoms.  If not helpful, I may be asked to stop them.    The risks, benefits, and side effects of these medicine(s) were explained to me. I agree that:    1. I will take part in other treatments as advised by my care team. This may be psychiatry or counseling, physical therapy, behavioral therapy, group treatment or a referral to a pain clinic. I will reduce or stop my medicine when my care team tells me to do so.  2. I will take my medicines as prescribed. I will not change the dose or schedule unless my care team tells me to. There will be no refills if I  run out early.   I may be contactedwithout warning and asked to complete a urine drug test or pill count at any time.   3. I will keep all my appointments, and understand this is part of the monitoring of controlled substances. My care team may require an office visit for EVERY controlled substance refill. If I miss appointments or don t follow instructions, my care team may stop my medicine.  4. I will not ask other providers to  prescribe controlled substances, and I will not accept controlled substances from other people. If I need another prescribed controlled substance for a new reason, I will tell my care team within 1 business day.  5. I will use one pharmacy to fill all of my controlled substance prescriptions, and it is up to me to make sure that I do not run out of my medicines on weekends or holidays. If my care team is willing to refill my controlled substance prescription without a visit, I must request refills only during office hours, refills may take up to 3 days to process, and it may take up to 5 to 7 days for my medicine to be mailed and ready at my pharmacy. Prescriptions will not be mailed anywhere except my pharmacy.    6. I am responsible for my prescriptions. If the medicine/prescription is lost or stolen, it will not be replaced. I also agree not to share controlled substance medicines with anyone.              [facility] [place]  08/10/20  Patient:  Chanel Cotto  YOB: 1990  Medical Record Number: [id number]  [facility]: [id number]    7. I agree to not use ANY illegal or recreational drugs. This includes marijuana, cocaine, bath salts or other drugs. I agree not to use alcohol unless my care team says I may. I agree to give urine samples whenever asked. If I don t give a urine sample, the care team may stop my medicine.    8. If I enroll in the [place] Medical Marijuana program, I will tell my care team. I will also sign an agreement to share my medical records with my care team.    9. I will bring in my list of medicines (or my medicine bottles) each time I come to the clinic.   10. I will tell my care team right away if I become pregnant or have a new medical problem treated outside of my regular clinic.  11. I understand that this medicine can affect my thinking and judgment. It may be unsafe for me to drive, use machinery and do dangerous tasks. I will not do any of these things until I know how  the medicine affects me. If my dose changes, I will wait to see how it affects me. I will contact my care team if I have concerns about medicine side effects.    I understand that if I do not follow any of the conditions above, my prescriptions or treatment may be stopped.      I agree that my provider, clinic care team, and pharmacy may work with any city, state or federal law enforcement agency that investigates the misuse, sale, or other diversion of my controlled medicine. I will allow my provider to discuss my care with or share a copy of this agreement with any other treating provider, pharmacy or emergency room where I receive care. I agree to give up (waive) any right of privacy or confidentiality with respect to these consents.   I have read this agreement and have asked questions about anything I did not understand.    ____________________________________________________    ____________  ________  Patient signature                                                         Date      Time    ____________________________________________________     ____________  ________  Witness                                                          Date      Time    ____________________________________________________  Provider signature

## 2020-09-04 DIAGNOSIS — F90.2 ATTENTION DEFICIT HYPERACTIVITY DISORDER (ADHD), COMBINED TYPE: ICD-10-CM

## 2020-09-08 NOTE — TELEPHONE ENCOUNTER
Controlled Substance Refill Request for amphetamine-dextroamphetamine (ADDERALL) 5 MG tablet   Problem List Complete:    Yes    Last Written Prescription Date:  8-10-20  Last Fill Quantity: 90 tablet,   # refills: 0    Last Office Visit with [facility] primary care provider: 8-10-20    Future Office visit:     Controlled substance agreement:   Encounter-Level CSA - 11/17/2017:    Controlled Substance Agreement - Scan on 12/1/2017 10:43 AM: CONTROLLED SUBSTANCE AGREEMENT     Patient-Level CSA:    Controlled Substance Agreement - Non - Opioid - Scan on 6/13/2019  7:30 AM: NON-OPIOID CONTROLLED SUBSTANCE AGREEMENT         Last Urine Drug Screen: No results found for: CDAUT,   Comprehen Drug Analysis UR   Date Value Ref Range Status   03/08/2018 FINAL  Final     Comment:     (Note)  ====================================================================  COMPREHENSIVE DRUG ANALYSIS,UR  ====================================================================  Test                             Result       Flag       Units        Drug Present   Amphetamine                    342                     ng/mg creat    Amphetamine is available as a schedule II prescription drug.  ====================================================================  Test                      Result    Flag   Units      Ref Range        Creatinine              145              mg/dL      >=20            ====================================================================  For clinical consultation, please call [phone].  ====================================================================  Analysis performed by [facility], [address] [place] [address]     , No results found for: THC13, PCP13, COC13, MAMP13, OPI13, AMP13, BZO13, TCA13, MTD13, BAR13, OXY13, PPX13, BUP13     Processing:  Fax Rx to listed pharmacy    [email or web address]   checked in past 3 months?  No, route to RN

## 2020-09-09 RX ORDER — DEXTROAMPHETAMINE SACCHARATE, AMPHETAMINE ASPARTATE, DEXTROAMPHETAMINE SULFATE AND AMPHETAMINE SULFATE 1.25; 1.25; 1.25; 1.25 MG/1; MG/1; MG/1; MG/1
TABLET ORAL
Qty: 90 TABLET | Refills: 0 | Status: SHIPPED | OUTPATIENT
Start: 2020-09-09 | End: 2020-10-08

## 2020-09-09 NOTE — TELEPHONE ENCOUNTER
Routing refill request to provider for review/approval because:  Drug not on the [facility] refill protocol     Ailyn Castillo RN, BSN  [facility]

## 2020-09-09 NOTE — TELEPHONE ENCOUNTER
Pt called and wondering about the prescription status which requested on 09/4    Pt also stated that she is back to [place] she she would like to pick the med from [facility] pharmacy     Matt Escobar

## 2020-09-18 ENCOUNTER — OFFICE VISIT (OUTPATIENT)
Dept: FAMILY MEDICINE | Facility: CLINIC | Age: 30
End: 2020-09-18
Payer: COMMERCIAL

## 2020-09-18 VITALS
OXYGEN SATURATION: 100 % | DIASTOLIC BLOOD PRESSURE: 80 MMHG | HEIGHT: 67 IN | BODY MASS INDEX: 18.36 KG/M2 | TEMPERATURE: 97.3 F | WEIGHT: 117 LBS | HEART RATE: 89 BPM | SYSTOLIC BLOOD PRESSURE: 122 MMHG

## 2020-09-18 DIAGNOSIS — G44.329 CHRONIC POST-TRAUMATIC HEADACHE, NOT INTRACTABLE: ICD-10-CM

## 2020-09-18 DIAGNOSIS — S09.90XD INJURY OF HEAD, SUBSEQUENT ENCOUNTER: Primary | ICD-10-CM

## 2020-09-18 PROCEDURE — 99214 OFFICE O/P EST MOD 30 MIN: CPT | Performed by: NURSE PRACTITIONER

## 2020-09-18 ASSESSMENT — MIFFLIN-ST. JEOR: SCORE: 1288.34

## 2020-09-18 NOTE — PROGRESS NOTES
"Subjective   Chanel Cotto is a 30 year old female who presents to clinic today for the following health issues:    HPI   Head Injury       Duration: x 3 months     Description (location/character/radiation): Pt reports ongoing headache after head injury that took place in June/2020. Pt requesting MRI of head.     Intensity:  moderate    Accompanying signs and symptoms: Head tenderness and throbbing pain     History (similar episodes/previous evaluation): was seen in the ER in [place], evaluation for concussion.     Precipitating or alleviating factors: None    Therapies tried and outcome: None      About 1 month after had light and sound sensitivity. Then cleared.     Ongoing headaches by end of day almost daily but sometime on/off.    Always has some tenderness in the area of the scalp where she was hit.       Reviewed and updated as needed this visit by provider:  Tobacco  Allergies  Meds  Problems  Med Hx  Surg Hx  Fam Hx         Review of Systems   Constitutional, HEENT, cardiovascular, pulmonary, GI, , musculoskeletal, neuro, skin, endocrine and psych systems are negative, except as otherwise noted per HPI.      Objective   /80   Pulse 89   Temp 97.3  F (36.3  C) (Tympanic)   Ht 1.702 m (5' 7\")   Wt 53.1 kg (117 lb)   SpO2 100%   Breastfeeding No   BMI 18.32 kg/m   Body mass index is 18.32 kg/m .  Physical Exam   GENERAL: healthy, alert, well nourished, well hydrated, no distress  HENT: ear canals- normal; TMs- normal; Nose- normal; Mouth- no ulcers, no lesions  NECK: no tenderness, no adenopathy, no asymmetry, no masses, no stiffness; thyroid- normal to palpation  RESP: lungs clear to auscultation - no rales, no rhonchi, no wheezes  CV: regular rates and rhythm, normal S1 S2, no S3 or S4 and no murmur, no click or rub -  NEURO: strength and tone- normal, sensory exam- grossly normal, mentation- intact, speech- normal, reflexes- symmetric    Diagnostic Test Results  Pending    "   Assessment & Plan   Chanel was seen today for head injury and headache.    Diagnoses and all orders for this visit:    Injury of head, subsequent encounter  -     MR Brain w/o Contrast; Future    Chronic post-traumatic headache, not intractable  -     MR Brain w/o Contrast; Future             See Patient Instructions    No follow-ups on file.            SOFIA Mann     [address] [address]  [email or web address]   Office: [phone]

## 2020-10-08 ENCOUNTER — MYC REFILL (OUTPATIENT)
Dept: FAMILY MEDICINE | Facility: CLINIC | Age: 30
End: 2020-10-08

## 2020-10-08 DIAGNOSIS — F90.2 ATTENTION DEFICIT HYPERACTIVITY DISORDER (ADHD), COMBINED TYPE: ICD-10-CM

## 2020-10-08 RX ORDER — DEXTROAMPHETAMINE SACCHARATE, AMPHETAMINE ASPARTATE, DEXTROAMPHETAMINE SULFATE AND AMPHETAMINE SULFATE 1.25; 1.25; 1.25; 1.25 MG/1; MG/1; MG/1; MG/1
TABLET ORAL
Qty: 90 TABLET | Refills: 0 | Status: SHIPPED | OUTPATIENT
Start: 2020-12-07 | End: 2020-12-29

## 2020-10-08 RX ORDER — DEXTROAMPHETAMINE SACCHARATE, AMPHETAMINE ASPARTATE, DEXTROAMPHETAMINE SULFATE AND AMPHETAMINE SULFATE 1.25; 1.25; 1.25; 1.25 MG/1; MG/1; MG/1; MG/1
TABLET ORAL
Qty: 90 TABLET | Refills: 0 | Status: SHIPPED | OUTPATIENT
Start: 2020-10-08 | End: 2020-12-29

## 2020-10-08 RX ORDER — DEXTROAMPHETAMINE SACCHARATE, AMPHETAMINE ASPARTATE, DEXTROAMPHETAMINE SULFATE AND AMPHETAMINE SULFATE 1.25; 1.25; 1.25; 1.25 MG/1; MG/1; MG/1; MG/1
TABLET ORAL
Qty: 90 TABLET | Refills: 0 | Status: SHIPPED | OUTPATIENT
Start: 2020-11-07 | End: 2020-12-29

## 2020-10-08 NOTE — TELEPHONE ENCOUNTER
General Call:     Who is calling:  Chanel    Reason for Call:  Chanel stopped by the clinic to drop off her controlled substance agreement. She also was asking when her refill for Adderall will be done. Patient was notified this could take up to 72 hours. She said she is completely out.    Okay to leave a detailed message:Yes at Cell number on file:    Telephone Information:   Mobile [phone]

## 2020-10-08 NOTE — TELEPHONE ENCOUNTER
Controlled Substance Refill Request for amphetamine-dextroamphetamine (ADDERALL) 5 MG tablet  Problem List Complete:    Yes    Last Written Prescription Date:  9-9-20  Last Fill Quantity: 90 tablet,   # refills: 0      Last Office Visit with [facility] primary care provider: 8-10-20    Future Office visit:     Controlled substance agreement:   Encounter-Level CSA - 11/17/2017:    Controlled Substance Agreement - Scan on 12/1/2017 10:43 AM: CONTROLLED SUBSTANCE AGREEMENT     Patient-Level CSA:    Controlled Substance Agreement - Opioid - Scan on 10/8/2020 12:32 PM  Controlled Substance Agreement - Non - Opioid - Scan on 6/13/2019  7:30 AM: NON-OPIOID CONTROLLED SUBSTANCE AGREEMENT         Last Urine Drug Screen: No results found for: Valerie CHRISTIANSON Drug Analysis UR   Date Value Ref Range Status   03/08/2018 FINAL  Final     Comment:     (Note)  ====================================================================  COMPREHENSIVE DRUG ANALYSIS,UR  ====================================================================  Test                             Result       Flag       Units        Drug Present   Amphetamine                    342                     ng/mg creat    Amphetamine is available as a schedule II prescription drug.  ====================================================================  Test                      Result    Flag   Units      Ref Range        Creatinine              145              mg/dL      >=20            ====================================================================  For clinical consultation, please call [phone].  ====================================================================  Analysis performed by [employer] [place] [address]     , No results found for: THC13, PCP13, COC13, MAMP13, OPI13, AMP13, BZO13, TCA13, MTD13, BAR13, OXY13, PPX13, BUP13     Processing:  Fax Rx to listed pharmacy    [email or web address]   checked in past 3 months?   No, route to RN

## 2020-10-08 NOTE — TELEPHONE ENCOUNTER
Routing refill request to provider for review/approval because:  Drug not on the [facility] refill protocol. Triage doesn't have access to  for [facility] providers.

## 2020-10-09 RX ORDER — DEXTROAMPHETAMINE SACCHARATE, AMPHETAMINE ASPARTATE, DEXTROAMPHETAMINE SULFATE AND AMPHETAMINE SULFATE 1.25; 1.25; 1.25; 1.25 MG/1; MG/1; MG/1; MG/1
TABLET ORAL
Qty: 90 TABLET | Refills: 0 | OUTPATIENT
Start: 2020-10-09

## 2020-10-16 ENCOUNTER — HOSPITAL ENCOUNTER (OUTPATIENT)
Dept: MRI IMAGING | Facility: CLINIC | Age: 30
Discharge: HOME | End: 2020-10-16
Attending: NURSE PRACTITIONER | Admitting: NURSE PRACTITIONER
Payer: COMMERCIAL

## 2020-10-16 DIAGNOSIS — S09.90XD INJURY OF HEAD, SUBSEQUENT ENCOUNTER: ICD-10-CM

## 2020-10-16 DIAGNOSIS — G44.329 CHRONIC POST-TRAUMATIC HEADACHE, NOT INTRACTABLE: ICD-10-CM

## 2020-10-16 PROCEDURE — 70551 MRI BRAIN STEM W/O DYE: CPT

## 2020-11-29 ENCOUNTER — HEALTH MAINTENANCE LETTER (OUTPATIENT)
Age: 30
End: 2020-11-29

## 2020-12-29 ENCOUNTER — VIRTUAL VISIT (OUTPATIENT)
Dept: FAMILY MEDICINE | Facility: CLINIC | Age: 30
End: 2020-12-29
Payer: COMMERCIAL

## 2020-12-29 VITALS — BODY MASS INDEX: 19.26 KG/M2 | WEIGHT: 123 LBS

## 2020-12-29 DIAGNOSIS — F90.2 ATTENTION DEFICIT HYPERACTIVITY DISORDER (ADHD), COMBINED TYPE: ICD-10-CM

## 2020-12-29 PROCEDURE — 99213 OFFICE O/P EST LOW 20 MIN: CPT | Mod: 95 | Performed by: FAMILY MEDICINE

## 2020-12-29 RX ORDER — DEXTROAMPHETAMINE SACCHARATE, AMPHETAMINE ASPARTATE, DEXTROAMPHETAMINE SULFATE AND AMPHETAMINE SULFATE 1.25; 1.25; 1.25; 1.25 MG/1; MG/1; MG/1; MG/1
TABLET ORAL
Qty: 90 TABLET | Refills: 0 | Status: SHIPPED | OUTPATIENT
Start: 2021-03-06 | End: 2021-04-30

## 2020-12-29 RX ORDER — DEXTROAMPHETAMINE SACCHARATE, AMPHETAMINE ASPARTATE, DEXTROAMPHETAMINE SULFATE AND AMPHETAMINE SULFATE 1.25; 1.25; 1.25; 1.25 MG/1; MG/1; MG/1; MG/1
TABLET ORAL
Qty: 90 TABLET | Refills: 0 | Status: SHIPPED | OUTPATIENT
Start: 2021-01-06 | End: 2021-04-30

## 2020-12-29 RX ORDER — DEXTROAMPHETAMINE SACCHARATE, AMPHETAMINE ASPARTATE, DEXTROAMPHETAMINE SULFATE AND AMPHETAMINE SULFATE 1.25; 1.25; 1.25; 1.25 MG/1; MG/1; MG/1; MG/1
TABLET ORAL
Qty: 90 TABLET | Refills: 0 | Status: SHIPPED | OUTPATIENT
Start: 2021-02-05 | End: 2021-04-30

## 2020-12-29 NOTE — PROGRESS NOTES
Assessment & Plan   Attention deficit hyperactivity disorder (ADHD), combined type  - appointments every 6 months  Controlled - continue medication. n  - amphetamine-dextroamphetamine (ADDERALL) 5 MG tablet  Dispense: 90 tablet; Refill: 0             Return in about 6 months (around 6/29/2021) for with a video visit, with Dr Andrew Sales.      Andrew Sales MD      [address] [address]  [email or web address]   Office: [phone]       Subjective   Chanel Cotto is a 30 year old female who is being evaluated via a billable telephone visit today for the following health issues:      Patient has given verbal consent for Telephone visit?  Yes    How would you like to obtain your AVS? MyChart    Chief Complaint  Patient presents with:  Recheck Medication       HPI   Medication Followup of Adderall    Taking Medication as prescribed: yes    Side Effects:  None    Medication Helping Symptoms:  yes    Reviewed and updated as needed this visit by Provider  Tobacco  Allergies  Meds  Problems  Med Hx  Surg Hx  Fam Hx          ROS: Constitutional, HEENT, cardiovascular, pulmonary, GI, , musculoskeletal, neuro, skin, endocrine and psych systems are negative, except as otherwise noted.       Objective   Reported vitals:  Wt 55.8 kg (123 lb)   BMI 19.26 kg/m     Gen: healthy, alert, and no distress  Psych: Alert and oriented times 3; coherent speech, normal   rate and volume, able to articulate logical thoughts, able   to abstract reason, no tangential thoughts, no hallucinations   or delusions.  Her affect is normal.      Phone call duration:  7 minutes

## 2021-04-26 ENCOUNTER — IMMUNIZATION (OUTPATIENT)
Dept: NURSING | Facility: CLINIC | Age: 31
End: 2021-04-26
Payer: COMMERCIAL

## 2021-04-26 PROCEDURE — 0001A PR COVID VAC PFIZER DIL RECON 30 MCG/0.3 ML IM: CPT

## 2021-04-26 PROCEDURE — 91300 PR COVID VAC PFIZER DIL RECON 30 MCG/0.3 ML IM: CPT

## 2021-04-30 ENCOUNTER — VIRTUAL VISIT (OUTPATIENT)
Dept: FAMILY MEDICINE | Facility: CLINIC | Age: 31
End: 2021-04-30
Payer: COMMERCIAL

## 2021-04-30 VITALS — HEIGHT: 68 IN | WEIGHT: 127 LBS | BODY MASS INDEX: 19.25 KG/M2

## 2021-04-30 DIAGNOSIS — F90.2 ATTENTION DEFICIT HYPERACTIVITY DISORDER (ADHD), COMBINED TYPE: ICD-10-CM

## 2021-04-30 PROCEDURE — 99213 OFFICE O/P EST LOW 20 MIN: CPT | Mod: 95 | Performed by: FAMILY MEDICINE

## 2021-04-30 RX ORDER — DEXTROAMPHETAMINE SACCHARATE, AMPHETAMINE ASPARTATE, DEXTROAMPHETAMINE SULFATE AND AMPHETAMINE SULFATE 1.25; 1.25; 1.25; 1.25 MG/1; MG/1; MG/1; MG/1
TABLET ORAL
Qty: 90 TABLET | Refills: 0 | Status: SHIPPED | OUTPATIENT
Start: 2021-05-30 | End: 2021-08-31

## 2021-04-30 RX ORDER — DEXTROAMPHETAMINE SACCHARATE, AMPHETAMINE ASPARTATE, DEXTROAMPHETAMINE SULFATE AND AMPHETAMINE SULFATE 1.25; 1.25; 1.25; 1.25 MG/1; MG/1; MG/1; MG/1
TABLET ORAL
Qty: 90 TABLET | Refills: 0 | Status: SHIPPED | OUTPATIENT
Start: 2021-04-30 | End: 2021-08-31

## 2021-04-30 RX ORDER — DEXTROAMPHETAMINE SACCHARATE, AMPHETAMINE ASPARTATE, DEXTROAMPHETAMINE SULFATE AND AMPHETAMINE SULFATE 1.25; 1.25; 1.25; 1.25 MG/1; MG/1; MG/1; MG/1
TABLET ORAL
Qty: 90 TABLET | Refills: 0 | Status: SHIPPED | OUTPATIENT
Start: 2021-06-29 | End: 2021-07-30

## 2021-04-30 ASSESSMENT — MIFFLIN-ST. JEOR: SCORE: 1336.63

## 2021-04-30 NOTE — PROGRESS NOTES
Chanel is a 31 year old who is being evaluated via a billable telephone visit.      What phone number would you like to be contacted at? [phone]    How would you like to obtain your AVS? MyChart    Assessment & Plan   Attention deficit hyperactivity disorder (ADHD), combined type  - appointments every 6 months  Controlled - continue medication.  - amphetamine-dextroamphetamine (ADDERALL) 5 MG tablet  Dispense: 90 tablet; Refill: 0  - amphetamine-dextroamphetamine (ADDERALL) 5 MG tablet  Dispense: 90 tablet; Refill: 0  - amphetamine-dextroamphetamine (ADDERALL) 5 MG tablet  Dispense: 90 tablet; Refill: 0        Return in about 3 months (around 7/30/2021) for medication recheck, in person, with Dr Andrew Sales.      Andrew Sales MD      [address] [address]  [email or web address]   Office: [phone]       Subjective   Chanel is a 31 year old who presents for the following health issues     HPI     Medication Followup of Adderall 5mg - 2 tablet w/bkft an 1 tab w/lunch    Taking Medication as prescribed: yes    Side Effects:  None    Medication Helping Symptoms:  yes    Mood is good      How many servings of fruits and vegetables do you eat daily?  4 or more    On average, how many sweetened beverages do you drink each day (Examples: soda, juice, sweet tea, etc.  Do NOT count diet or artificially sweetened beverages)?   0    How many days per week do you exercise enough to make your heart beat faster? 7 - walks    How many minutes a day do you exercise enough to make your heart beat faster? 30 - 60    How many days per week do you miss taking your medication? 0    Review of Systems   Constitutional, HEENT, cardiovascular, pulmonary, gi and gu systems are negative, except as otherwise noted.      Objective           Vitals:  No vitals were obtained today due to virtual visit.    Physical Exam   healthy, alert and no distress  PSYCH: Alert and oriented times 3;  coherent speech, normal   rate and volume, able to articulate logical thoughts, able   to abstract reason, no tangential thoughts, no hallucinations   or delusions  Her affect is normal  RESP: No cough, no audible wheezing, able to talk in full sentences  Remainder of exam unable to be completed due to telephone visits              Phone call duration: 9 minutes

## 2021-04-30 NOTE — Clinical Note
Please abstract the following data from this visit with this patient into the appropriate field in Epic:    Tests that can be patient reported without a hard copy:    Pap smear done on this date: 4/23/2020 (approximately), by this group: [facility], results were normal.

## 2021-05-17 ENCOUNTER — IMMUNIZATION (OUTPATIENT)
Dept: NURSING | Facility: CLINIC | Age: 31
End: 2021-05-17
Attending: INTERNAL MEDICINE
Payer: COMMERCIAL

## 2021-05-17 PROCEDURE — 0002A PR COVID VAC PFIZER DIL RECON 30 MCG/0.3 ML IM: CPT

## 2021-05-17 PROCEDURE — 91300 PR COVID VAC PFIZER DIL RECON 30 MCG/0.3 ML IM: CPT

## 2021-06-07 NOTE — TELEPHONE ENCOUNTER
Pt's mother calling on patient's behalf with questions about how her daughter can be tested for COVID-19. Triage RN unable to discuss specific patient information as there is no consent in the chart to communicate with her mother.     I gave the patient's mother general general information about coronavirus testing in [place]. Only patients admitted in the hospital or health care providers are currently being tested for COVID-19. Therefore, patient would not be able to be tested at this time. Advised to take patient to the ED if symptoms become unmanageable and cannot be safely treated at home Examples of this include high fevers or inability to effectively manage breathing. Patient's mother verbalizes understanding.       Please use the information at the end of this document to sign up for [employer] where you can get your results and a message about those results sent to you through the [employer] application. If you do not have mychart we will call you with your results but it may take longer.    Regardless of if you have been tested or not:  Patient who have symptoms (cough, fever, or shortness of breath), need to isolate for 7 days from when symptoms started AND 72 hours after fever resolves (without fever reducing medications) AND improvement of respiratory symptoms (whichever is longer).      Isolate yourself at home (in own room/own bathroom if possible)    Do Not allow any visitors    Do Not go to work or school    Do Not go to Taoism,  centers, shopping, or other public places.    Do Not shake hands.    Avoid close and intimate contact with others (hugging, kissing).    Follow CDC recommendations for household cleaning of frequently touched services.     After the initial 7 days, continue to isolate yourself from household members as much as possible. To continue decrease the risk of community spread and exposure, you and any members of your household should limit activities in  "public for 14 days after starting home isolation.     You can reference the following CDC link for helpful home isolation/care tips:  https://www.cdc.gov/coronavirus/2019-ncov/downloads/10Things.pdf    Protect Others:    Cover Your Mouth and Nose with a mask, disposable tissue or wash cloth to avoid spreading germs to others.    Wash your hands and face frequently with soap and water    Call Back If: Breathing difficulty develops or you become worse.    For more information about COVID19 and options for caring for yourself at home, please visit the CDC website at https://www.cdc.gov/coronavirus/2019-ncov/about/steps-when-sick.html  For more options for care at [facility], please visit our website at [email or web address]      Maritza Edwards RN    Reason for Disposition    Information only question and nurse able to answer    Answer Assessment - Initial Assessment Questions  1. REASON FOR CALL or QUESTION: \"What is your reason for calling today?\" or \"How can I best help you?\" or \"What question do you have that I can help answer?\"      Questions about how to be tested for COVID-19.    Protocols used: NO PROTOCOL AVAILABLE - INFORMATION ONLY-A-OH      "

## 2021-07-26 DIAGNOSIS — F90.2 ATTENTION DEFICIT HYPERACTIVITY DISORDER (ADHD), COMBINED TYPE: ICD-10-CM

## 2021-07-27 NOTE — TELEPHONE ENCOUNTER
amphetamine-dextroamphetamine (ADDERALL) 5 MG tablet 90 tablet 0 6/29/2021 7/29/2021 No   Sig - Route: Take 2 tablets (10 mg) by mouth daily (with breakfast) AND 1 tablet (5 mg) daily (with lunch). - Oral   Sent to pharmacy as: Amphetamine-Dextroamphetamine 5 MG Oral Tablet (ADDERALL)   Class: E-Prescribe       Last office visit: 9/18/2020 with prescribing provider:     Future Office Visit:          Encounter-Level CSA - 11/17/2017:    Controlled Substance Agreement - Scan on 12/1/2017 10:43 AM: CONTROLLED SUBSTANCE AGREEMENT     Patient-Level CSA:    Controlled Substance Agreement - Opioid - Scan on 10/8/2020 12:32 PM  Controlled Substance Agreement - Non - Opioid - Scan on 6/13/2019  7:30 AM: NON-OPIOID CONTROLLED SUBSTANCE AGREEMENT           Routing refill request to provider for review/approval because:  Drug not on the [facility] refill protocol     Ailyn Castillo RN, BSN  [facility]

## 2021-07-28 ENCOUNTER — MYC MEDICAL ADVICE (OUTPATIENT)
Dept: FAMILY MEDICINE | Facility: CLINIC | Age: 31
End: 2021-07-28

## 2021-07-30 RX ORDER — DEXTROAMPHETAMINE SACCHARATE, AMPHETAMINE ASPARTATE, DEXTROAMPHETAMINE SULFATE AND AMPHETAMINE SULFATE 1.25; 1.25; 1.25; 1.25 MG/1; MG/1; MG/1; MG/1
TABLET ORAL
Qty: 90 TABLET | Refills: 0 | Status: SHIPPED | OUTPATIENT
Start: 2021-07-30 | End: 2021-08-31

## 2021-07-30 NOTE — TELEPHONE ENCOUNTER
Routing to provider. Medication still pending    Mychart response sent letting her know she is due for a med check before the next refill.     Katja Rivera RN  [facility]

## 2021-07-30 NOTE — TELEPHONE ENCOUNTER
Med sent    Last visit in this dept:    4/30/2021     Next visit in this dept:       Health Maintenance   Topic Date Due     ADVANCE CARE PLANNING  Never done     Pneumococcal Vaccine: Pediatrics (0 to 5 Years) and At-Risk Patients (6 to 64 Years) (1 of 2 - PPSV23) Never done     HEPATITIS C SCREENING  Never done     PREVENTIVE CARE VISIT  09/10/2011     INFLUENZA VACCINE (1) 09/01/2021     ANNUAL REVIEW OF HM ORDERS  12/29/2021     PAP  04/23/2023     DTAP/TDAP/TD IMMUNIZATION (9 - Td or Tdap) 11/15/2028     PHQ-2  Completed     IPV IMMUNIZATION  Completed     HEPATITIS B IMMUNIZATION  Completed     COVID-19 Vaccine  Completed     MENINGITIS IMMUNIZATION  Aged Out     HIV SCREENING  Discontinued

## 2021-08-31 ENCOUNTER — OFFICE VISIT (OUTPATIENT)
Dept: FAMILY MEDICINE | Facility: CLINIC | Age: 31
End: 2021-08-31
Payer: COMMERCIAL

## 2021-08-31 VITALS
HEIGHT: 68 IN | BODY MASS INDEX: 18.49 KG/M2 | HEART RATE: 107 BPM | OXYGEN SATURATION: 98 % | WEIGHT: 122 LBS | TEMPERATURE: 98.7 F | SYSTOLIC BLOOD PRESSURE: 110 MMHG | DIASTOLIC BLOOD PRESSURE: 62 MMHG

## 2021-08-31 DIAGNOSIS — F90.2 ATTENTION DEFICIT HYPERACTIVITY DISORDER (ADHD), COMBINED TYPE: ICD-10-CM

## 2021-08-31 PROCEDURE — 99213 OFFICE O/P EST LOW 20 MIN: CPT | Performed by: FAMILY MEDICINE

## 2021-08-31 RX ORDER — DEXTROAMPHETAMINE SACCHARATE, AMPHETAMINE ASPARTATE, DEXTROAMPHETAMINE SULFATE AND AMPHETAMINE SULFATE 1.25; 1.25; 1.25; 1.25 MG/1; MG/1; MG/1; MG/1
TABLET ORAL
Qty: 90 TABLET | Refills: 0 | Status: SHIPPED | OUTPATIENT
Start: 2021-09-30 | End: 2021-12-15

## 2021-08-31 RX ORDER — DEXTROAMPHETAMINE SACCHARATE, AMPHETAMINE ASPARTATE, DEXTROAMPHETAMINE SULFATE AND AMPHETAMINE SULFATE 1.25; 1.25; 1.25; 1.25 MG/1; MG/1; MG/1; MG/1
TABLET ORAL
Qty: 90 TABLET | Refills: 0 | Status: SHIPPED | OUTPATIENT
Start: 2021-10-30 | End: 2021-11-29

## 2021-08-31 RX ORDER — DEXTROAMPHETAMINE SACCHARATE, AMPHETAMINE ASPARTATE, DEXTROAMPHETAMINE SULFATE AND AMPHETAMINE SULFATE 1.25; 1.25; 1.25; 1.25 MG/1; MG/1; MG/1; MG/1
TABLET ORAL
Qty: 90 TABLET | Refills: 0 | Status: SHIPPED | OUTPATIENT
Start: 2021-08-31 | End: 2021-12-15

## 2021-08-31 ASSESSMENT — MIFFLIN-ST. JEOR: SCORE: 1313.95

## 2021-08-31 NOTE — PROGRESS NOTES
"    Assessment & Plan   Attention deficit hyperactivity disorder (ADHD), combined type  - appointments every 6 months  CSA signed,Controlled - continue medication.  - amphetamine-dextroamphetamine (ADDERALL) 5 MG tablet  Dispense: 90 tablet; Refill: 0  - amphetamine-dextroamphetamine (ADDERALL) 5 MG tablet  Dispense: 90 tablet; Refill: 0  - amphetamine-dextroamphetamine (ADDERALL) 5 MG tablet  Dispense: 90 tablet; Refill: 0      Tobacco Cessation:   reports that she has been smoking cigarettes. She has a 2.50 pack-year smoking history. She has never used smokeless tobacco.        Return in about 6 months (around 2/28/2022) for with a video visit, or, in person, with Dr Andrew Sales.      Andrew Sales MD      [address] [address]  [email or web address]   Office: [phone]       Lazarus Buchanan is a 31 year old who presents for the following health issues     HPI     Medication Followup of Adderall 5mg - last rx 07/30/2021    Taking Medication as prescribed: yes    Side Effects:  None    Medication Helping Symptoms:  yes    Review of Systems   Constitutional, HEENT, cardiovascular, pulmonary, GI, , musculoskeletal, neuro, skin, endocrine and psych systems are negative, except as otherwise noted.      Objective    /62 (BP Location: Right arm, Patient Position: Chair, Cuff Size: Adult Regular)   Pulse 107   Temp 98.7  F (37.1  C) (Tympanic)   Ht 1.715 m (5' 7.5\")   Wt 55.3 kg (122 lb)   LMP 08/10/2021 (Approximate)   SpO2 98%   Breastfeeding No   BMI 18.83 kg/m    Body mass index is 18.83 kg/m .  Physical Exam   GENERAL: healthy, alert and no distress  NECK: no adenopathy, no asymmetry, masses, or scars and thyroid normal to palpation  RESP: lungs clear to auscultation - no rales, rhonchi or wheezes  CV: regular rate and rhythm, normal S1 S2, no S3 or S4, no murmur, click or rub, no peripheral edema and peripheral pulses strong  ABDOMEN: soft, " nontender, no hepatosplenomegaly, no masses and bowel sounds normal  MS: no gross musculoskeletal defects noted, no edema  SKIN: no suspicious lesions or rashes  NEURO: Normal strength and tone, mentation intact and speech normal  BACK: no CVA tenderness, no paralumbar tenderness  PSYCH: mentation appears normal, affect normal/bright

## 2021-08-31 NOTE — LETTER
[facility] [place]  08/31/21  Patient: Chanel Cotto  YOB: 1990  Medical Record Number: [id number]                                                                                  Non-Opioid Controlled Substance Agreement    This is an agreement between you and your provider regarding safe and appropriate use of controlled substances prescribed by your care team. Controlled substances are?medicines that can cause physical and mental dependence (abuse).     There are strict laws about having and using these medicines. We here at [facility] are  committed to working with you in your efforts to get better. To support you in this work, we'll help you schedule regular office appointments for medicine refills. If we must cancel or change your appointment for any reason, we'll make sure you have enough medicine to last until your next appointment.     As a Provider, I will:     Listen carefully to your concerns while treating you with respect.     Recommend a treatment plan that I believe is in your best interest and may involve therapies other than medicine.      Talk with you often about the possible benefits and the risk of harm of any medicine that we prescribe for you.    Assess the safety of this medicine and check how well it works.      Provide a plan on how to taper (discontinue or go off) using this medicine if the decision is made to stop its use.    ::  As a Patient, I understand controlled substances:       Are prescribed by my care provider to help me function or work and manage my condition(s).?    Are strong medicines and can cause serious side effects.       Need to be taken exactly as prescribed.?Combining controlled substances with certain medicines or chemicals (such as illegal drugs, alcohol, sedatives, sleeping pills, and benzodiazepines) can be dangerous or even fatal.? If I stop taking my medicines suddenly, I may have severe withdrawal symptoms.   The risks,  benefits, and side effects of these medicine(s) were explained to me. I agree that:    1. I will take part in other treatments as advised by my care team. This may be psychiatry or counseling, physical therapy, behavioral therapy, group treatment or a referral to specialist.    2. I will keep all my appointments and understand this is part of the monitoring of controlled substances.?My care team may require an office visit for EVERY controlled substance refill. If I miss appointments or don t follow instructions, my care team may stop my medicine    3. I will take my medicines as prescribed. I will not change the dose or schedule unless my care team tells me to. There will be no refills if I run out early.      4. I may be asked to come to the clinic and complete a urine drug test or complete a pill count. If I don t give a urine sample or participate in a pill count, the care team may stop my medicine.    5. I will only receive controlled substance prescriptions from this clinic. If I am treated by another provider, I will tell them that I am taking controlled substances and that I have a treatment agreement with this provider. I will inform my [facility] care team within one business day if I am given a prescription for any controlled substance by another healthcare provider. My [facility] care team can contact other providers and pharmacists about my use of any medicines.    6. It is up to me to make sure that I don't run out of my medicines on weekends or holidays.?If my care team is willing to refill my prescription without a visit, I must request refills only during office hours. Refills may take up to 3 business days to process. I will use one pharmacy to fill all my controlled substance prescriptions. I will notify the clinic about any changes to my insurance or medicine availability.    7. I am responsible for my prescriptions. If the medicine/prescription is lost, stolen or destroyed, it will  not be replaced.?I also agree not to share controlled substance medicines with anyone.     8. I am aware I should not use any illegal or recreational drugs. I agree not to drink alcohol unless my care team says I can.     9. If I enroll in the [place] Medical Cannabis program, I will tell my care team before my next refill.    10. I will tell my care team right away if I become pregnant, have a new medical problem treated outside of my regular clinic, or have a change in my medicines.     11. I understand that this medicine can affect my thinking, judgment and reaction time.? Alcohol and drugs affect the brain and body, which can affect the safety of my driving. Being under the influence of alcohol or drugs can affect my decision-making, behaviors, personal safety and the safety of others. Driving while impaired (DWI) can occur if a person is driving, operating or in physical control of a car, motorcycle, boat, snowmobile, ATV, motorbike, off-road vehicle or any other motor vehicle ([place] Statute 169A.20). I understand the risk if I choose to drive or operate any vehicle or machinery.    I understand that if I do not follow any of the conditions above, my prescriptions or treatment may be stopped or changed.   I agree that my provider, clinic care team and pharmacy may work with any city, state or federal law enforcement agency that investigates the misuse, sale or other diversion of my controlled medicine. I will allow my provider to discuss my care with, or share a copy of, this agreement with any other treating provider, pharmacy or emergency room where I receive care.     I have read this agreement and have asked questions about anything I did not understand.    ________________________________________________________  Patient Signature - Chanel Cotto     ___________________                   Date     ________________________________________________________  Provider Signature - Xu Sales MD        ___________________                   Date     ________________________________________________________  Witness Signature (required if provider not present while patient signing)          ___________________                   Date

## 2021-09-01 RX ORDER — DEXTROAMPHETAMINE SACCHARATE, AMPHETAMINE ASPARTATE, DEXTROAMPHETAMINE SULFATE AND AMPHETAMINE SULFATE 1.25; 1.25; 1.25; 1.25 MG/1; MG/1; MG/1; MG/1
TABLET ORAL
Qty: 90 TABLET | Refills: 0 | OUTPATIENT
Start: 2021-09-01 | End: 2021-10-01

## 2021-09-01 NOTE — TELEPHONE ENCOUNTER
Filled on 8/31/20201  Ailyn Castillo RN, BSN  [facility]

## 2021-09-12 ENCOUNTER — ANCILLARY PROCEDURE (OUTPATIENT)
Dept: GENERAL RADIOLOGY | Facility: CLINIC | Age: 31
End: 2021-09-12
Attending: PHYSICIAN ASSISTANT
Payer: COMMERCIAL

## 2021-09-12 ENCOUNTER — OFFICE VISIT (OUTPATIENT)
Dept: URGENT CARE | Facility: URGENT CARE | Age: 31
End: 2021-09-12
Payer: COMMERCIAL

## 2021-09-12 VITALS
RESPIRATION RATE: 16 BRPM | HEART RATE: 72 BPM | OXYGEN SATURATION: 100 % | WEIGHT: 122 LBS | TEMPERATURE: 97.8 F | DIASTOLIC BLOOD PRESSURE: 80 MMHG | BODY MASS INDEX: 18.83 KG/M2 | SYSTOLIC BLOOD PRESSURE: 120 MMHG

## 2021-09-12 DIAGNOSIS — S69.92XA INJURY OF LEFT HAND, INITIAL ENCOUNTER: Primary | ICD-10-CM

## 2021-09-12 PROCEDURE — 99214 OFFICE O/P EST MOD 30 MIN: CPT | Performed by: PHYSICIAN ASSISTANT

## 2021-09-12 PROCEDURE — 73130 X-RAY EXAM OF HAND: CPT | Mod: LT | Performed by: RADIOLOGY

## 2021-09-12 ASSESSMENT — ENCOUNTER SYMPTOMS
CARDIOVASCULAR NEGATIVE: 1
GASTROINTESTINAL NEGATIVE: 1
NEUROLOGICAL NEGATIVE: 1
ENDOCRINE NEGATIVE: 1
RESPIRATORY NEGATIVE: 1
PSYCHIATRIC NEGATIVE: 1

## 2021-09-12 NOTE — LETTER
September 12, 2021      To Whom It May Concern:      Chanel Cotto was seen in our urgent care today, 09/12/21.  I expect her condition to improve over the next 7-14 days.  She may return to work with limited restrictions effective immediately. Please allow her to work in ways that don't require her to  or use her forearm/wrist muscles extensively.     Sincerely,        Conor Watts PA-C

## 2021-09-12 NOTE — PROGRESS NOTES
Injury of left hand, initial encounter  - XR Hand Left G/E 3 Views    30 minutes spent on the date of the encounter doing chart review, history and exam, documentation and further activities per the note     Conor Watts PA-C  [facility]    Subjective   31 year old who presents to clinic today for the following health issues:    Hand Injury       HPI     Musculoskeletal problem/pain  Onset/Duration: 2 days after an injury. Patient fell at a concert and her hand was stepped on   Description  Location: Left hand  Joint Swelling: YES  Redness: no  Pain: YES (7-8/10)  Warmth: no  Intensity:  moderate  Progression of Symptoms:  same  Accompanying signs and symptoms:   Fevers: no  Numbness/tingling/weakness: YES  History  Trauma to the area: YES  Recent illness:  no  Previous similar problem: no  Previous evaluation:  no  Precipitating or alleviating factors:  Aggravating factors include: none  Therapies tried and outcome: ice and acetaminophen      Review of Systems   Review of Systems   Respiratory: Negative.    Cardiovascular: Negative.    Gastrointestinal: Negative.    Endocrine: Negative.    Genitourinary: Negative.    Neurological: Negative.    Psychiatric/Behavioral: Negative.    All other systems reviewed and are negative.     See HPI     Objective    Temp: 97.8  F (36.6  C) Temp src: Oral BP: 120/80 Pulse: 72   Resp: 16 SpO2: 100 %       Physical Exam   Physical Exam  Constitutional:       General: She is not in acute distress.     Appearance: Normal appearance. She is normal weight. She is not ill-appearing, toxic-appearing or diaphoretic.   HENT:      Head: Normocephalic and atraumatic.   Cardiovascular:      Rate and Rhythm: Normal rate.      Pulses: Normal pulses.   Pulmonary:      Effort: Pulmonary effort is normal. No respiratory distress.   Musculoskeletal:      Right hand: Swelling, tenderness and bony tenderness present. No deformity or lacerations. Normal range of motion. Normal  strength. Normal sensation. There is no disruption of two-point discrimination. Normal capillary refill. Normal pulse.        Hands:    Neurological:      General: No focal deficit present.      Mental Status: She is alert and oriented to person, place, and time. Mental status is at baseline.      Gait: Gait normal.   Psychiatric:         Mood and Affect: Mood normal.         Behavior: Behavior normal.         Thought Content: Thought content normal.         Judgment: Judgment normal.        An X-ray was ordered today and reviewed by me. There does not appear to be any evidence of fracture or dislocation. Awaiting radiology report.       Results for orders placed or performed in visit on 09/12/21 (from the past 24 hour(s))   XR Hand Left G/E 3 Views    Narrative    XR LEFT HAND THREE OR MORE VIEWS   9/12/2021 2:00 PM     HISTORY: Left hand pain after injury 2 days ago.    COMPARISON: None.      Impression    IMPRESSION: Normal joint spacing. No evidence of fracture.

## 2021-09-19 ENCOUNTER — HEALTH MAINTENANCE LETTER (OUTPATIENT)
Age: 31
End: 2021-09-19

## 2021-11-14 ENCOUNTER — HEALTH MAINTENANCE LETTER (OUTPATIENT)
Age: 31
End: 2021-11-14

## 2021-12-14 DIAGNOSIS — F90.2 ATTENTION DEFICIT HYPERACTIVITY DISORDER (ADHD), COMBINED TYPE: ICD-10-CM

## 2021-12-14 NOTE — LETTER
[facility] [place] [address] [address] [phone]       December 31, 2021    Chanel Cotto  [address] [address] [facility] [place]  [place] [facility] [place] [address]    To Chanel:     We have been calling you regarding a recent refill request we received for Adderall.  Unfortunately, we were unable to reach you.  We are notifying you that you are due for med check in about 3 months prior to your next refill.  You can schedule this appointment via [employer] or by calling the clinic at [phone] Option 1.        Sincerely,          Andrew Sales M.D./crc

## 2021-12-14 NOTE — TELEPHONE ENCOUNTER
Routing refill request to provider for review/approval because:  Drug not on the G refill protocol     Pending Prescriptions:                       Disp   Refills    amphetamine-dextroamphetamine (ADDERALL) 5*90 tab*0        Sig: TAKE TWO TABLETS BY MOUTH DAILY WITH BREAKFAST AND           TAKE ONE TABLET DAILY WITH LUNCH

## 2021-12-15 RX ORDER — DEXTROAMPHETAMINE SACCHARATE, AMPHETAMINE ASPARTATE, DEXTROAMPHETAMINE SULFATE AND AMPHETAMINE SULFATE 1.25; 1.25; 1.25; 1.25 MG/1; MG/1; MG/1; MG/1
TABLET ORAL
Qty: 90 TABLET | Refills: 0 | Status: SHIPPED | OUTPATIENT
Start: 2022-02-13 | End: 2022-04-26

## 2021-12-15 RX ORDER — DEXTROAMPHETAMINE SACCHARATE, AMPHETAMINE ASPARTATE, DEXTROAMPHETAMINE SULFATE AND AMPHETAMINE SULFATE 1.25; 1.25; 1.25; 1.25 MG/1; MG/1; MG/1; MG/1
TABLET ORAL
Qty: 90 TABLET | Refills: 0 | Status: SHIPPED | OUTPATIENT
Start: 2021-12-15 | End: 2022-01-10

## 2021-12-15 RX ORDER — DEXTROAMPHETAMINE SACCHARATE, AMPHETAMINE ASPARTATE, DEXTROAMPHETAMINE SULFATE AND AMPHETAMINE SULFATE 1.25; 1.25; 1.25; 1.25 MG/1; MG/1; MG/1; MG/1
TABLET ORAL
Qty: 90 TABLET | Refills: 0 | Status: SHIPPED | OUTPATIENT
Start: 2022-01-14 | End: 2022-04-26

## 2021-12-15 NOTE — TELEPHONE ENCOUNTER
Please schedule a follow-up medication visit (video or in person okay) in about 3 months.  Medication sent in to get to that point.    Last visit in this dept:    8/31/2021     Next visit in this dept:       Health Maintenance   Topic Date Due     ADVANCE CARE PLANNING  Never done     Pneumococcal Vaccine: Pediatrics (0 to 5 Years) and At-Risk Patients (6 to 64 Years) (1 of 2 - PPSV23) Never done     PREVENTIVE CARE VISIT  09/10/2011     INFLUENZA VACCINE (1) Never done     COVID-19 Vaccine (3 - Booster for Pfizer series) 11/17/2021     ANNUAL REVIEW OF HM ORDERS  12/29/2021     PAP  04/23/2023     DTAP/TDAP/TD IMMUNIZATION (9 - Td or Tdap) 11/15/2028     PHQ-2  Completed     IPV IMMUNIZATION  Completed     HEPATITIS B IMMUNIZATION  Completed     MENINGITIS IMMUNIZATION  Aged Out     HEPATITIS C SCREENING  Discontinued     HIV SCREENING  Discontinued

## 2022-01-09 ENCOUNTER — HEALTH MAINTENANCE LETTER (OUTPATIENT)
Age: 32
End: 2022-01-09

## 2022-01-10 ENCOUNTER — VIRTUAL VISIT (OUTPATIENT)
Dept: FAMILY MEDICINE | Facility: CLINIC | Age: 32
End: 2022-01-10
Payer: COMMERCIAL

## 2022-01-10 DIAGNOSIS — F90.2 ATTENTION DEFICIT HYPERACTIVITY DISORDER (ADHD), COMBINED TYPE: Primary | ICD-10-CM

## 2022-01-10 PROBLEM — F10.20 ALCOHOL USE DISORDER, SEVERE, DEPENDENCE (H): Status: ACTIVE | Noted: 2022-01-10

## 2022-01-10 PROBLEM — F33.1 RECURRENT MAJOR DEPRESSIVE EPISODES, MODERATE (H): Status: ACTIVE | Noted: 2022-01-10

## 2022-01-10 PROBLEM — F10.20 ALCOHOL USE DISORDER, SEVERE, DEPENDENCE (H): Status: RESOLVED | Noted: 2022-01-10 | Resolved: 2022-01-10

## 2022-01-10 PROBLEM — F33.1 RECURRENT MAJOR DEPRESSIVE EPISODES, MODERATE (H): Status: RESOLVED | Noted: 2022-01-10 | Resolved: 2022-01-10

## 2022-01-10 PROCEDURE — 99213 OFFICE O/P EST LOW 20 MIN: CPT | Mod: 95 | Performed by: FAMILY MEDICINE

## 2022-01-10 RX ORDER — DEXTROAMPHETAMINE SACCHARATE, AMPHETAMINE ASPARTATE, DEXTROAMPHETAMINE SULFATE AND AMPHETAMINE SULFATE 1.25; 1.25; 1.25; 1.25 MG/1; MG/1; MG/1; MG/1
TABLET ORAL
Qty: 90 TABLET | Refills: 0 | Status: SHIPPED | OUTPATIENT
Start: 2022-03-15 | End: 2022-04-22

## 2022-01-10 NOTE — PROGRESS NOTES
Chanel is a 31 year old who is being evaluated via a billable video visit.      How would you like to obtain your AVS? MyChart  If the video visit is dropped, the invitation should be resent by:   Will anyone else be joining your video visit? No      Video Start Time: 4:40 PM    Assessment & Plan   Attention deficit hyperactivity disorder (ADHD), combined type  - appointments every 6 months  Controlled - continue medication.   - amphetamine-dextroamphetamine (ADDERALL) 5 MG tablet  Dispense: 90 tablet; Refill: 0      Tobacco Cessation:   reports that she has been smoking cigarettes. She has a 2.50 pack-year smoking history. She has never used smokeless tobacco.      Return in about 6 months (around 7/10/2022) for medication recheck, in person, with Dr Andrew Sales.      Andrew Sales MD      [address] [address]  [facility].[place]   Office: [phone]       Subjective   Chanel is a 31 year old who presents for the following health issues     HPI       ADHD followup  On Adderall - doing ok     Uses it for work usually.    Mood is ok.  Sleep is good.    Just moved to [place].    Review of Systems   Constitutional, HEENT, cardiovascular, pulmonary, gi and gu systems are negative, except as otherwise noted.      Objective         Vitals:  No vitals were obtained today due to virtual visit.    Physical Exam   GENERAL: Healthy, alert and no distress  EYES: Eyes grossly normal to inspection.  No discharge or erythema, or obvious scleral/conjunctival abnormalities.  RESP: No audible wheeze, cough, or visible cyanosis.  No visible retractions or increased work of breathing.    SKIN: Visible skin clear. No significant rash, abnormal pigmentation or lesions.  NEURO: Cranial nerves grossly intact.  Mentation and speech appropriate for age.  PSYCH: Mentation appears normal, affect normal/bright, judgement and insight intact, normal speech and appearance well-groomed.         Video-Visit Details    Type of service:  Video Visit    Video End Time:5:08 PM    Originating Location (pt. Location): Home    Distant Location (provider location):  [facility]     Platform used for Video Visit: Realeyes

## 2022-02-17 PROBLEM — F90.2 ATTENTION DEFICIT HYPERACTIVITY DISORDER (ADHD), COMBINED TYPE: Status: ACTIVE | Noted: 2018-11-29

## 2022-04-22 ENCOUNTER — MYC MEDICAL ADVICE (OUTPATIENT)
Dept: FAMILY MEDICINE | Facility: CLINIC | Age: 32
End: 2022-04-22
Payer: COMMERCIAL

## 2022-04-22 ENCOUNTER — MYC REFILL (OUTPATIENT)
Dept: FAMILY MEDICINE | Facility: CLINIC | Age: 32
End: 2022-04-22
Payer: COMMERCIAL

## 2022-04-22 DIAGNOSIS — F90.2 ATTENTION DEFICIT HYPERACTIVITY DISORDER (ADHD), COMBINED TYPE: ICD-10-CM

## 2022-04-22 RX ORDER — DEXTROAMPHETAMINE SACCHARATE, AMPHETAMINE ASPARTATE, DEXTROAMPHETAMINE SULFATE AND AMPHETAMINE SULFATE 1.25; 1.25; 1.25; 1.25 MG/1; MG/1; MG/1; MG/1
TABLET ORAL
Qty: 90 TABLET | Refills: 0 | Status: SHIPPED | OUTPATIENT
Start: 2022-04-22 | End: 2022-05-22

## 2022-04-22 NOTE — TELEPHONE ENCOUNTER
Routing refill request to provider for review/approval because:  Drug not on the [facility] refill protocol     LOV:  1/10/2022     Future Appointments   Date Time Provider Department Center   4/25/2022 11:40 AM Xu Sales MD [facility]       CSA -- Patient Level:    Controlled Substance Agreement - Opioid - Scan on 10/8/2020 12:32 PM  Controlled Substance Agreement - Non - Opioid - Scan on 6/13/2019  7:30 AM: NON-OPIOID CONTROLLED SUBSTANCE AGREEMENT         Katja Rivera RN  [facility]

## 2022-04-26 ENCOUNTER — VIRTUAL VISIT (OUTPATIENT)
Dept: FAMILY MEDICINE | Facility: CLINIC | Age: 32
End: 2022-04-26
Payer: COMMERCIAL

## 2022-04-26 DIAGNOSIS — F90.2 ATTENTION DEFICIT HYPERACTIVITY DISORDER (ADHD), COMBINED TYPE: ICD-10-CM

## 2022-04-26 PROCEDURE — 99213 OFFICE O/P EST LOW 20 MIN: CPT | Mod: 95 | Performed by: FAMILY MEDICINE

## 2022-04-26 RX ORDER — DEXTROAMPHETAMINE SACCHARATE, AMPHETAMINE ASPARTATE, DEXTROAMPHETAMINE SULFATE AND AMPHETAMINE SULFATE 1.25; 1.25; 1.25; 1.25 MG/1; MG/1; MG/1; MG/1
TABLET ORAL
Qty: 90 TABLET | Refills: 0 | Status: SHIPPED | OUTPATIENT
Start: 2022-05-22 | End: 2022-05-06

## 2022-04-26 RX ORDER — DEXTROAMPHETAMINE SACCHARATE, AMPHETAMINE ASPARTATE, DEXTROAMPHETAMINE SULFATE AND AMPHETAMINE SULFATE 1.25; 1.25; 1.25; 1.25 MG/1; MG/1; MG/1; MG/1
TABLET ORAL
Qty: 90 TABLET | Refills: 0 | Status: SHIPPED | OUTPATIENT
Start: 2022-07-21 | End: 2022-05-06

## 2022-04-26 RX ORDER — DEXTROAMPHETAMINE SACCHARATE, AMPHETAMINE ASPARTATE, DEXTROAMPHETAMINE SULFATE AND AMPHETAMINE SULFATE 1.25; 1.25; 1.25; 1.25 MG/1; MG/1; MG/1; MG/1
TABLET ORAL
Qty: 90 TABLET | Refills: 0 | Status: SHIPPED | OUTPATIENT
Start: 2022-06-21 | End: 2022-05-06

## 2022-04-26 ASSESSMENT — ANXIETY QUESTIONNAIRES
IF YOU CHECKED OFF ANY PROBLEMS ON THIS QUESTIONNAIRE, HOW DIFFICULT HAVE THESE PROBLEMS MADE IT FOR YOU TO DO YOUR WORK, TAKE CARE OF THINGS AT HOME, OR GET ALONG WITH OTHER PEOPLE: NOT DIFFICULT AT ALL
2. NOT BEING ABLE TO STOP OR CONTROL WORRYING: NOT AT ALL
1. FEELING NERVOUS, ANXIOUS, OR ON EDGE: NOT AT ALL
5. BEING SO RESTLESS THAT IT IS HARD TO SIT STILL: NOT AT ALL
GAD7 TOTAL SCORE: 0
7. FEELING AFRAID AS IF SOMETHING AWFUL MIGHT HAPPEN: NOT AT ALL
3. WORRYING TOO MUCH ABOUT DIFFERENT THINGS: NOT AT ALL
6. BECOMING EASILY ANNOYED OR IRRITABLE: NOT AT ALL

## 2022-04-26 ASSESSMENT — PATIENT HEALTH QUESTIONNAIRE - PHQ9
5. POOR APPETITE OR OVEREATING: NOT AT ALL
SUM OF ALL RESPONSES TO PHQ QUESTIONS 1-9: 0

## 2022-04-26 NOTE — PROGRESS NOTES
Chanel is a 32 year old who is being evaluated via a billable video visit.      How would you like to obtain your AVS? MyChart  If the video visit is dropped, the invitation should be resent by: Text to cell phone: [phone]  Will anyone else be joining your video visit? No    Video Start Time: 5:46 PM    Assessment & Plan   Attention deficit hyperactivity disorder (ADHD), combined type  - appointments every 6 months  Mood is doing well,  Controlled - continue medication(s).  - amphetamine-dextroamphetamine (ADDERALL) 5 MG tablet  Dispense: 90 tablet; Refill: 0  - amphetamine-dextroamphetamine (ADDERALL) 5 MG tablet  Dispense: 90 tablet; Refill: 0  - amphetamine-dextroamphetamine (ADDERALL) 5 MG tablet  Dispense: 90 tablet; Refill: 0      Return in about 6 months (around 10/26/2022) for medication recheck, in person, with Dr Andrew Sales.      Andrew Sales MD      [address] [address]  [employer]   Office: [phone]       Subjective   Chanel is a 32 year old who presents for the following health issues     HPI       Medication Followup of ADHD -  Adderall    Taking Medication as prescribed: yes    Side Effects:  None    Medication Helping Symptoms:  yes     Move is better  - moved  PHQ 4/26/2022   PHQ-9 Total Score 0   Q9: Thoughts of better off dead/self-harm past 2 weeks Not at all     LOPEZ-7 SCORE 4/26/2022   Total Score 0       Review of Systems   Constitutional, HEENT, cardiovascular, pulmonary, gi and gu systems are negative, except as otherwise noted.      Objective           Vitals:  No vitals were obtained today due to virtual visit.    Physical Exam   GENERAL: Healthy, alert and no distress  EYES: Eyes grossly normal to inspection.  No discharge or erythema, or obvious scleral/conjunctival abnormalities.  RESP: No audible wheeze, cough, or visible cyanosis.  No visible retractions or increased work of breathing.    SKIN: Visible skin clear.  No significant rash, abnormal pigmentation or lesions.  NEURO: Cranial nerves grossly intact.  Mentation and speech appropriate for age.  PSYCH: Mentation appears normal, affect normal/bright, judgement and insight intact, normal speech and appearance well-groomed.          Video-Visit Details    Type of service:  Video Visit    Video End Time:5:54 PM    Originating Location (pt. Location): Home    Distant Location (provider location):  [facility]     [facility] Visit[facility]

## 2022-04-27 ASSESSMENT — ANXIETY QUESTIONNAIRES: GAD7 TOTAL SCORE: 0

## 2022-05-06 ENCOUNTER — OFFICE VISIT (OUTPATIENT)
Dept: FAMILY MEDICINE | Facility: CLINIC | Age: 32
End: 2022-05-06
Payer: COMMERCIAL

## 2022-05-06 VITALS — SYSTOLIC BLOOD PRESSURE: 120 MMHG | HEART RATE: 56 BPM | DIASTOLIC BLOOD PRESSURE: 79 MMHG | OXYGEN SATURATION: 100 %

## 2022-05-06 DIAGNOSIS — Z11.1 SCREENING EXAMINATION FOR PULMONARY TUBERCULOSIS: Primary | ICD-10-CM

## 2022-05-06 PROCEDURE — 99212 OFFICE O/P EST SF 10 MIN: CPT | Performed by: NURSE PRACTITIONER

## 2022-05-06 PROCEDURE — 86481 TB AG RESPONSE T-CELL SUSP: CPT | Performed by: NURSE PRACTITIONER

## 2022-05-06 PROCEDURE — 36415 COLL VENOUS BLD VENIPUNCTURE: CPT | Performed by: NURSE PRACTITIONER

## 2022-05-06 NOTE — PROGRESS NOTES
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  Her screens have been negative in the past.  She prefers the QuantiFERON gold so that she will not have to return                Tobacco Cessation:   reports that she has been smoking cigarettes. She has a 2.50 pack-year smoking history. She has never used smokeless tobacco.          No follow-ups on file.    Sakshi Anton NP  [facility]    Lazarus [place] is a 32 year old who presents for the following health issues  accompanied by her self.    History of Present Illness       Reason for visit:  TB blood test  Symptoms include:  None  What makes it worse:  No    She eats 4 or more servings of fruits and vegetables daily.She consumes 0 sweetened beverage(s) daily.She exercises with enough effort to increase her heart rate 20 to 29 minutes per day.  She exercises with enough effort to increase her heart rate 6 days per week.   She is taking medications regularly.       Needing TB testing for job       Review of Systems         Objective    /79 (BP Location: Right arm)   Pulse 56   SpO2 100%   There is no height or weight on file to calculate BMI.  Physical Exam       AOx3, lungs clear

## 2022-05-08 LAB
GAMMA INTERFERON BACKGROUND BLD IA-ACNC: 0.02 IU/ML
M TB IFN-G BLD-IMP: NEGATIVE
M TB IFN-G CD4+ BCKGRND COR BLD-ACNC: 9.98 IU/ML
MITOGEN IGNF BCKGRD COR BLD-ACNC: 0 IU/ML
MITOGEN IGNF BCKGRD COR BLD-ACNC: 0.01 IU/ML
QUANTIFERON MITOGEN: 10 IU/ML
QUANTIFERON NIL TUBE: 0.02 IU/ML
QUANTIFERON TB1 TUBE: 0.02 IU/ML
QUANTIFERON TB2 TUBE: 0.03

## 2022-05-24 DIAGNOSIS — F90.2 ATTENTION DEFICIT HYPERACTIVITY DISORDER (ADHD), COMBINED TYPE: ICD-10-CM

## 2022-05-24 NOTE — TELEPHONE ENCOUNTER
amphetamine-dextroamphetamine (ADDERALL) 5 MG tablet 90 tablet 0 4/22/2022 5/22/2022 No   Sig - Route: Take 2 tablets (10 mg) by mouth every morning AND 1 tablet (5 mg) daily (with lunch). - Oral   Sent to pharmacy as: Amphetamine-Dextroamphetamine 5 MG Oral Tablet (ADDERALL)   Class: E-Prescribe       Last office visit: 5/6/2022     Future Office Visit:        CSA -- Patient Level:    Controlled Substance Agreement - Opioid - Scan on 10/8/2020 12:32 PM  Controlled Substance Agreement - Non - Opioid - Scan on 6/13/2019  7:30 AM: NON-OPIOID CONTROLLED SUBSTANCE AGREEMENT             Routing refill request to provider for review/approval because:  Drug not on the [facility] refill protocol     Ailyn Castillo RN, BSN  [facility]

## 2022-05-26 RX ORDER — DEXTROAMPHETAMINE SACCHARATE, AMPHETAMINE ASPARTATE, DEXTROAMPHETAMINE SULFATE AND AMPHETAMINE SULFATE 1.25; 1.25; 1.25; 1.25 MG/1; MG/1; MG/1; MG/1
TABLET ORAL
Qty: 90 TABLET | Refills: 0 | Status: SHIPPED | OUTPATIENT
Start: 2022-06-25 | End: 2022-11-17

## 2022-05-26 RX ORDER — DEXTROAMPHETAMINE SACCHARATE, AMPHETAMINE ASPARTATE, DEXTROAMPHETAMINE SULFATE AND AMPHETAMINE SULFATE 1.25; 1.25; 1.25; 1.25 MG/1; MG/1; MG/1; MG/1
TABLET ORAL
Qty: 90 TABLET | Refills: 0 | OUTPATIENT
Start: 2022-05-26

## 2022-05-26 RX ORDER — DEXTROAMPHETAMINE SACCHARATE, AMPHETAMINE ASPARTATE, DEXTROAMPHETAMINE SULFATE AND AMPHETAMINE SULFATE 1.25; 1.25; 1.25; 1.25 MG/1; MG/1; MG/1; MG/1
TABLET ORAL
Qty: 90 TABLET | Refills: 0 | Status: SHIPPED | OUTPATIENT
Start: 2022-07-25 | End: 2022-06-09

## 2022-05-26 RX ORDER — DEXTROAMPHETAMINE SACCHARATE, AMPHETAMINE ASPARTATE, DEXTROAMPHETAMINE SULFATE AND AMPHETAMINE SULFATE 1.25; 1.25; 1.25; 1.25 MG/1; MG/1; MG/1; MG/1
TABLET ORAL
Qty: 90 TABLET | Refills: 0 | Status: SHIPPED | OUTPATIENT
Start: 2022-05-26 | End: 2022-11-17

## 2022-06-08 DIAGNOSIS — F90.2 ATTENTION DEFICIT HYPERACTIVITY DISORDER (ADHD), COMBINED TYPE: ICD-10-CM

## 2022-06-09 RX ORDER — DEXTROAMPHETAMINE SACCHARATE, AMPHETAMINE ASPARTATE, DEXTROAMPHETAMINE SULFATE AND AMPHETAMINE SULFATE 1.25; 1.25; 1.25; 1.25 MG/1; MG/1; MG/1; MG/1
TABLET ORAL
Qty: 90 TABLET | Refills: 0 | Status: SHIPPED | OUTPATIENT
Start: 2022-07-25 | End: 2022-11-17

## 2022-11-11 RX ORDER — DEXTROAMPHETAMINE SACCHARATE, AMPHETAMINE ASPARTATE, DEXTROAMPHETAMINE SULFATE AND AMPHETAMINE SULFATE 1.25; 1.25; 1.25; 1.25 MG/1; MG/1; MG/1; MG/1
TABLET ORAL
Qty: 90 TABLET | Refills: 0 | OUTPATIENT
Start: 2022-11-11

## 2022-11-11 NOTE — TELEPHONE ENCOUNTER
Not sure why or how this med request came to me.  I saw this patient once in May 2022 for tuberculosis test only.  Looks like patient needs an appointment with the prescribing provider

## 2022-11-17 ENCOUNTER — VIRTUAL VISIT (OUTPATIENT)
Dept: FAMILY MEDICINE | Facility: CLINIC | Age: 32
End: 2022-11-17
Payer: COMMERCIAL

## 2022-11-17 DIAGNOSIS — F90.2 ATTENTION DEFICIT HYPERACTIVITY DISORDER (ADHD), COMBINED TYPE: Primary | ICD-10-CM

## 2022-11-17 PROCEDURE — 99213 OFFICE O/P EST LOW 20 MIN: CPT | Mod: 95 | Performed by: FAMILY MEDICINE

## 2022-11-17 RX ORDER — DEXTROAMPHETAMINE SACCHARATE, AMPHETAMINE ASPARTATE, DEXTROAMPHETAMINE SULFATE AND AMPHETAMINE SULFATE 2.5; 2.5; 2.5; 2.5 MG/1; MG/1; MG/1; MG/1
TABLET ORAL
Qty: 60 TABLET | Refills: 0 | Status: SHIPPED | OUTPATIENT
Start: 2022-12-17 | End: 2023-04-10

## 2022-11-17 RX ORDER — DEXTROAMPHETAMINE SACCHARATE, AMPHETAMINE ASPARTATE, DEXTROAMPHETAMINE SULFATE AND AMPHETAMINE SULFATE 1.25; 1.25; 1.25; 1.25 MG/1; MG/1; MG/1; MG/1
TABLET ORAL
Qty: 120 TABLET | Refills: 0 | Status: SHIPPED | OUTPATIENT
Start: 2022-11-17 | End: 2023-04-10

## 2022-11-17 RX ORDER — DEXTROAMPHETAMINE SACCHARATE, AMPHETAMINE ASPARTATE, DEXTROAMPHETAMINE SULFATE AND AMPHETAMINE SULFATE 1.25; 1.25; 1.25; 1.25 MG/1; MG/1; MG/1; MG/1
1 TABLET ORAL 2 TIMES DAILY PRN
COMMUNITY
Start: 2022-08-31 | End: 2022-11-17

## 2022-11-17 RX ORDER — NALTREXONE HYDROCHLORIDE 50 MG/1
TABLET, FILM COATED ORAL PRN
COMMUNITY
Start: 2022-10-17 | End: 2023-10-10

## 2022-11-17 RX ORDER — DEXTROAMPHETAMINE SACCHARATE, AMPHETAMINE ASPARTATE, DEXTROAMPHETAMINE SULFATE AND AMPHETAMINE SULFATE 2.5; 2.5; 2.5; 2.5 MG/1; MG/1; MG/1; MG/1
TABLET ORAL
Qty: 90 TABLET | Refills: 0 | Status: SHIPPED | OUTPATIENT
Start: 2023-01-16 | End: 2023-03-29

## 2022-11-17 ASSESSMENT — ANXIETY QUESTIONNAIRES
5. BEING SO RESTLESS THAT IT IS HARD TO SIT STILL: NOT AT ALL
1. FEELING NERVOUS, ANXIOUS, OR ON EDGE: SEVERAL DAYS
3. WORRYING TOO MUCH ABOUT DIFFERENT THINGS: SEVERAL DAYS
IF YOU CHECKED OFF ANY PROBLEMS ON THIS QUESTIONNAIRE, HOW DIFFICULT HAVE THESE PROBLEMS MADE IT FOR YOU TO DO YOUR WORK, TAKE CARE OF THINGS AT HOME, OR GET ALONG WITH OTHER PEOPLE: SOMEWHAT DIFFICULT
GAD7 TOTAL SCORE: 2
6. BECOMING EASILY ANNOYED OR IRRITABLE: NOT AT ALL
GAD7 TOTAL SCORE: 2
7. FEELING AFRAID AS IF SOMETHING AWFUL MIGHT HAPPEN: NOT AT ALL
2. NOT BEING ABLE TO STOP OR CONTROL WORRYING: NOT AT ALL

## 2022-11-17 ASSESSMENT — PATIENT HEALTH QUESTIONNAIRE - PHQ9
5. POOR APPETITE OR OVEREATING: NOT AT ALL
10. IF YOU CHECKED OFF ANY PROBLEMS, HOW DIFFICULT HAVE THESE PROBLEMS MADE IT FOR YOU TO DO YOUR WORK, TAKE CARE OF THINGS AT HOME, OR GET ALONG WITH OTHER PEOPLE: SOMEWHAT DIFFICULT
SUM OF ALL RESPONSES TO PHQ QUESTIONS 1-9: 4
SUM OF ALL RESPONSES TO PHQ QUESTIONS 1-9: 4

## 2022-11-17 NOTE — PROGRESS NOTES
Chanel is a 32 year old who is being evaluated via a billable video visit.      How would you like to obtain your AVS? MyChart  If the video visit is dropped, the invitation should be resent by: Text to cell phone: [phone]  Will anyone else be joining your video visit? No    Assessment & Plan   Attention deficit hyperactivity disorder (ADHD), combined type  - appointments every 6 months  Controlled - continue medication.  She will usually take 10 mg in the morning and 5 mg in the afternoon.  Will adjust prescription to 10 mg tablets and she can take half a tab in the afternoon  - amphetamine-dextroamphetamine (ADDERALL) 5 MG tablet  Dispense: 120 tablet; Refill: 0  - amphetamine-dextroamphetamine (ADDERALL) 10 MG tablet  Dispense: 60 tablet; Refill: 0  - amphetamine-dextroamphetamine (ADDERALL) 10 MG tablet  Dispense: 90 tablet; Refill: 0    Return in about 6 months (around 5/17/2023) for medication recheck.      Andrew Sales MD      [address] [address]  [email or web address]   Office: [phone]       Subjective   Chanel is a 32 year old, presenting for the following health issues:  Recheck Medication      History of Present Illness       Reason for visit:  Medication check    She eats 4 or more servings of fruits and vegetables daily.She consumes 1 sweetened beverage(s) daily.She exercises with enough effort to increase her heart rate 20 to 29 minutes per day.  She exercises with enough effort to increase her heart rate 5 days per week.   She is taking medications regularly.    Today's PHQ-9         PHQ-9 Total Score: 4    PHQ-9 Q9 Thoughts of better off dead/self-harm past 2 weeks :   Not at all    How difficult have these problems made it for you to do your work, take care of things at home, or get along with other people: Somewhat difficult       Mediation Check - ADHD & Refill    Doing well      Depression and Anxiety Follow-Up    How are you doing  with your depression since your last visit? No change    How are you doing with your anxiety since your last visit?  No change    Are you having other symptoms that might be associated with depression or anxiety? No    Have you had a significant life event? No     Do you have any concerns with your use of alcohol or other drugs? No    Social History     Tobacco Use     Smoking status: Every Day     Packs/day: 0.25     Years: 10.00     Pack years: 2.50     Types: Cigarettes     Smokeless tobacco: Never     Tobacco comments:     Have smoked on and off throughout the years, occasionally   Vaping Use     Vaping Use: Never used   Substance Use Topics     Alcohol use: Yes     Alcohol/week: 1.0 standard drink     Comment: Rare, socially     Drug use: No     Comment: no herbal meds either      PHQ 4/26/2022 11/17/2022   PHQ-9 Total Score 0 4   Q9: Thoughts of better off dead/self-harm past 2 weeks Not at all Not at all     LOPEZ-7 SCORE 4/26/2022 11/17/2022   Total Score 0 2     Last PHQ-9 11/17/2022   1.  Little interest or pleasure in doing things 0   2.  Feeling down, depressed, or hopeless 0   3.  Trouble falling or staying asleep, or sleeping too much 1   4.  Feeling tired or having little energy 1   5.  Poor appetite or overeating 0   6.  Feeling bad about yourself 0   7.  Trouble concentrating 2   8.  Moving slowly or restless 0   Q9: Thoughts of better off dead/self-harm past 2 weeks 0   PHQ-9 Total Score 4   Difficulty at work, home, or with people -     LOPEZ-7  11/17/2022   1. Feeling nervous, anxious, or on edge 1   2. Not being able to stop or control worrying 0   3. Worrying too much about different things 1   4. Trouble relaxing 0   5. Being so restless that it is hard to sit still 0   6. Becoming easily annoyed or irritable 0   7. Feeling afraid, as if something awful might happen 0   LOPEZ-7 Total Score 2   If you checked any problems, how difficult have they made it for you to do your work, take care of things  at home, or get along with other people? Somewhat difficult     Suicide Assessment Five-step Evaluation and Treatment (SAFE-T)        Review of Systems         Objective       Vitals:  No vitals were obtained today due to virtual visit.    Physical Exam   GENERAL: Healthy, alert and no distress  EYES: Eyes grossly normal to inspection.  No discharge or erythema, or obvious scleral/conjunctival abnormalities.  RESP: No audible wheeze, cough, or visible cyanosis.  No visible retractions or increased work of breathing.    SKIN: Visible skin clear. No significant rash, abnormal pigmentation or lesions.  NEURO: Cranial nerves grossly intact.  Mentation and speech appropriate for age.  PSYCH: Mentation appears normal, affect normal/bright, judgement and insight intact, normal speech and appearance well-groomed.            Video-Visit Details    Video Start Time: 8:44 AM    Type of service:  Video Visit    Video End Time:8:52 AM    Originating Location (pt. Location): Home  Distant Location (provider location):  On-site    Platform used for Video Visit: [email or web address]

## 2022-11-21 ENCOUNTER — HEALTH MAINTENANCE LETTER (OUTPATIENT)
Age: 32
End: 2022-11-21

## 2022-12-09 ENCOUNTER — TELEPHONE (OUTPATIENT)
Dept: FAMILY MEDICINE | Facility: CLINIC | Age: 32
End: 2022-12-09

## 2022-12-09 NOTE — TELEPHONE ENCOUNTER
Pharmacy  asking for an ok to fill the 10mg Adderall's . Nov 17th pharmacy only has 5mg's not enough to give her the whole prescription   now can fill the 10mg tablets      Beba HERMOSILLO RN   [facility] Triage

## 2022-12-20 NOTE — TELEPHONE ENCOUNTER
Reason for Call:  Other Phone visit     Detailed comments: Pt would like to know if she can have a phone visit today with Dr Sales  Please call her back to let her know    Phone Number Patient can be reached at: Cell number on file:    No relevant phone numbers on file.       Best Time: any    Can we leave a detailed message on this number? YES    Call taken on 11/27/2018 at 12:40 PM by Kita Morales      
The patient is scheduled for a phone visit today, 11/29/2018, at 4:40 p.m. with Dr. Sales.      Bernie Arora  Patient Representative  
Yes we do our phone visits at 4:40 you can put her on there today please call pt to inform. Nettie Martinez CMA    
none

## 2023-04-03 ENCOUNTER — TELEPHONE (OUTPATIENT)
Dept: FAMILY MEDICINE | Facility: CLINIC | Age: 33
End: 2023-04-03
Payer: COMMERCIAL

## 2023-04-03 NOTE — TELEPHONE ENCOUNTER
Reason for Call:  Form, our goal is to have forms completed with 72 hours, however, some forms may require a visit or additional information.    Type of letter, form or note:  Prior Auth for D-Amphetamine Salt Combo 10MG Tab    Who is the form from?: [facility] (if other please explain)    Where did the form come from: form was faxed in    What clinic location was the form placed at?: [facility]    Where the form was placed: Dr Sales Box/Folder    What number is listed as a contact on the form?: [phone] fax       Call taken on 4/3/2023 at 12:30 PM by Kita Morales

## 2023-04-03 NOTE — TELEPHONE ENCOUNTER
Prior Authorization Retail Medication Request    Medication/Dose: Prior Auth for D-Amphetamine Salt Combo 10MG Tab  ICD code (if different than what is on RX):    Previously Tried and Failed:                                      Rationale:     Insurance Name:  In chart  Insurance ID:    Plan ID is [id number]      Pharmacy Information (if different than what is on RX)  Name:   [facility]    Phone:  [phone]

## 2023-04-10 ENCOUNTER — VIRTUAL VISIT (OUTPATIENT)
Dept: FAMILY MEDICINE | Facility: CLINIC | Age: 33
End: 2023-04-10
Payer: COMMERCIAL

## 2023-04-10 DIAGNOSIS — F90.2 ATTENTION DEFICIT HYPERACTIVITY DISORDER (ADHD), COMBINED TYPE: ICD-10-CM

## 2023-04-10 PROCEDURE — 99213 OFFICE O/P EST LOW 20 MIN: CPT | Mod: VID | Performed by: FAMILY MEDICINE

## 2023-04-10 RX ORDER — DEXTROAMPHETAMINE SACCHARATE, AMPHETAMINE ASPARTATE, DEXTROAMPHETAMINE SULFATE AND AMPHETAMINE SULFATE 2.5; 2.5; 2.5; 2.5 MG/1; MG/1; MG/1; MG/1
TABLET ORAL
Qty: 60 TABLET | Refills: 0 | Status: SHIPPED | OUTPATIENT
Start: 2023-05-10 | End: 2023-10-10

## 2023-04-10 RX ORDER — DEXTROAMPHETAMINE SACCHARATE, AMPHETAMINE ASPARTATE, DEXTROAMPHETAMINE SULFATE AND AMPHETAMINE SULFATE 2.5; 2.5; 2.5; 2.5 MG/1; MG/1; MG/1; MG/1
TABLET ORAL
Qty: 60 TABLET | Refills: 0 | Status: SHIPPED | OUTPATIENT
Start: 2023-06-10 | End: 2023-10-10

## 2023-04-10 NOTE — PROGRESS NOTES
Chanel is a 32 year old who is being evaluated via a billable video visit.      How would you like to obtain your AVS? MyChart  If the video visit is dropped, the invitation should be resent by: Text to cell phone: [phone]  Will anyone else be joining your video visit? No      Assessment & Plan   Attention deficit hyperactivity disorder (ADHD), combined type  - appointments every 6 months  Controlled - continue medication.  - amphetamine-dextroamphetamine (ADDERALL) 10 MG tablet  Dispense: 60 tablet; Refill: 0  - amphetamine-dextroamphetamine (ADDERALL) 10 MG tablet  Dispense: 60 tablet; Refill: 0        No follow-ups on file.   Follow-up Visit   Expected date:  Oct 10, 2023 (Approximate)      Follow Up Appointment Details:     Follow-up with whom?: Me    Follow-Up for what?: Chronic Disease f/u    Chronic Disease f/u: General (Other)    Additional Details: ADHD recheck    How?: In Person    Is this an as-needed follow-up?: No                  Scribe Disclosure:   I, AKHIL HOOPER, am serving as a scribe; to document services personally performed by Xu Sales MD -based on data collection and the provider's statements to me.     Provider Disclosure:  I agree with above History, Review of Systems, Physical exam and Plan.  I have reviewed the content of the documentation and have edited it as needed. I have personally performed the services documented here and the documentation accurately represents those services and the decisions I have made.      Electronically signed by:      Andrew Sales MD      [address] [address]  [email or web address]   Office: [phone]       Subjective   Chanel is a 32 year old, presenting for the following health issues:  Recheck Medication and Refill Request        4/10/2023    11:41 AM   Additional Questions   Roomed by Arvin WHITESIDE CMA      History of Present Illness       Reason for visit:  Medication check    She eats 4 or  more servings of fruits and vegetables daily.She consumes 1 sweetened beverage(s) daily.She exercises with enough effort to increase her heart rate 30 to 60 minutes per day.  She exercises with enough effort to increase her heart rate 5 days per week.   She is taking medications regularly.    Medication Followup of ADHD - amphetamine-dextroamphetamine (ADDERALL) 10 MG tablet    Taking Medication as prescribed: yes    Side Effects:  None    Medication Helping Symptoms:  yes  She is here for a f/u on her ADHD medications which she states has been going well. She continues on Adderall 10 mg in the morning and anywhere from 5 mg-10 mg at lunch. She is now taking her naltrexone 50 mg PRN. Regarding sleep she states that she has been sleeping well. She denies any side effects from the medications.     Objective          Vitals:  No vitals were obtained today due to virtual visit.    Physical Exam   GENERAL: Healthy, alert and no distress  EYES: Eyes grossly normal to inspection.  No discharge or erythema, or obvious scleral/conjunctival abnormalities.  RESP: No audible wheeze, cough, or visible cyanosis.  No visible retractions or increased work of breathing.    SKIN: Visible skin clear. No significant rash, abnormal pigmentation or lesions.  NEURO: Cranial nerves grossly intact.  Mentation and speech appropriate for age.  PSYCH: Mentation appears normal, affect normal/bright, judgement and insight intact, normal speech and appearance well-groomed.          Video-Visit Details    Type of service:  Video Visit   Video Start Time: 12:32 PM  Video End Time:12:38 PM    Originating Location (pt. Location): Home  Distant Location (provider location):  On-site  Platform used for Video Visit: [employer]

## 2023-04-12 NOTE — TELEPHONE ENCOUNTER
Central Prior Authorization Team  Phone: [phone]    Prior Authorization Not Needed per Insurance    Medication: Prior Auth for D-Amphetamine Salt Combo 10MG Tab  Insurance Company: [employer] - Phone [phone] Fax [phone]  Expected CoPay:      Pharmacy Filling the Rx: [id number] [place] [address] [address] [facility]  Pharmacy Notified: Yes  Patient Notified:

## 2023-04-16 ENCOUNTER — HEALTH MAINTENANCE LETTER (OUTPATIENT)
Age: 33
End: 2023-04-16

## 2023-04-18 ENCOUNTER — OFFICE VISIT (OUTPATIENT)
Dept: FAMILY MEDICINE | Facility: CLINIC | Age: 33
End: 2023-04-18
Payer: COMMERCIAL

## 2023-04-18 VITALS
OXYGEN SATURATION: 100 % | SYSTOLIC BLOOD PRESSURE: 111 MMHG | TEMPERATURE: 97.3 F | DIASTOLIC BLOOD PRESSURE: 74 MMHG | RESPIRATION RATE: 16 BRPM | HEART RATE: 81 BPM

## 2023-04-18 DIAGNOSIS — L30.9 PERIORBITAL DERMATITIS: Primary | ICD-10-CM

## 2023-04-18 LAB — TSH SERPL DL<=0.005 MIU/L-ACNC: 1.11 UIU/ML (ref 0.3–4.2)

## 2023-04-18 PROCEDURE — 84443 ASSAY THYROID STIM HORMONE: CPT | Performed by: FAMILY MEDICINE

## 2023-04-18 PROCEDURE — 86200 CCP ANTIBODY: CPT | Performed by: FAMILY MEDICINE

## 2023-04-18 PROCEDURE — 86038 ANTINUCLEAR ANTIBODIES: CPT | Performed by: FAMILY MEDICINE

## 2023-04-18 PROCEDURE — 36415 COLL VENOUS BLD VENIPUNCTURE: CPT | Performed by: FAMILY MEDICINE

## 2023-04-18 PROCEDURE — 99213 OFFICE O/P EST LOW 20 MIN: CPT | Performed by: FAMILY MEDICINE

## 2023-04-18 PROCEDURE — 86431 RHEUMATOID FACTOR QUANT: CPT | Performed by: FAMILY MEDICINE

## 2023-04-18 RX ORDER — TRIAMCINOLONE ACETONIDE 1 MG/G
CREAM TOPICAL 2 TIMES DAILY
Qty: 30 G | Refills: 0 | Status: SHIPPED | OUTPATIENT
Start: 2023-04-18 | End: 2023-10-10

## 2023-04-18 ASSESSMENT — ENCOUNTER SYMPTOMS: EYE PAIN: 1

## 2023-04-18 NOTE — PROGRESS NOTES
Assessment & Plan     1. Periorbital dermatitis  - triamcinolone (KENALOG) 0.1 % external cream; Apply topically 2 times daily  Dispense: 30 g; Refill: 0  - Rheumatoid factor  - Anti Nuclear Neetu IgG by IFA with Reflex  - Cyclic Citrullinated Peptide Antibody IgG  - TSH with free T4 reflex    EHR reviewed.   Past medical history, problem list, past surgical history, family history, social history, medications reviewed, updated, reconciled.   Unclear etiology though we discussed the likelihood of some irritating or contact dermatitis. We discussed the cause or allergen can be hard to determine, but she will try to identify. Has already stopped eye and face makeup. Will try to monitor hair care products, soaps, detergents, other chemicals she could be in contact with. Due to dryness, discussed preliminary ruling out of autoimmune disease. Blood work collected.   We reviewed skin care. Trial of topical steroid. We discussed importance of tapering use within two weeks. Consider use again after several weeks break if needed. Additionally, if there is no change or relief, we will consider dermatology referral.         Joanne Brody MD  [facility]    Lazarus Buchanan is a 32 year old, presenting for the following health issues:  Eye Problem (Swelling, redness, both eyes, on and off for past month, feels raw or tender )    [age] old female here with concerns of a rash over her eyelids and the under eye area. She says this started about a month ago. The redness appeared as a rough patch. It is itchy and wonders if she is scratching a lot overnight, it is sore now. She tried some topical oils like coconut, vaseline, nothing seems to help much. She notes similar lesion on her hands some times. Over the winter she recalls wind and sun exposed areas on her face to be similarly irritated red and itchy. This usually goes away. There is no eye swelling. No eye drainage. Her vision is ok.  Other parts of her skin are ok. No joint pain or muscle aches.           4/18/2023     9:41 AM   Additional Questions   Roomed by Tia     Eye Problem     History of Present Illness       Reason for visit:  Medication check    She eats 4 or more servings of fruits and vegetables daily.She consumes 1 sweetened beverage(s) daily.She exercises with enough effort to increase her heart rate 30 to 60 minutes per day.  She exercises with enough effort to increase her heart rate 5 days per week.   She is taking medications regularly.       Review of Systems   Eyes: Positive for pain.          Objective    /74 (BP Location: Left arm, Patient Position: Sitting, Cuff Size: Adult Regular)   Pulse 81   Temp 97.3  F (36.3  C) (Temporal)   Resp 16   LMP 03/31/2023 (Exact Date)   SpO2 100%   There is no height or weight on file to calculate BMI.  Physical Exam   GENERAL: healthy, alert and no distress  EYES: PERRL and conjunctivae and sclerae normal, there is periorbital scaling and erythema bilaterally, no vesicles, no pustules, no drainage  NECK: no adenopathy, no asymmetry, masses, or scars and thyroid normal to palpation  RESP: lungs clear to auscultation - no rales, rhonchi or wheezes  CV: regular rate and rhythm, normal S1 S2, no S3 or S4, no murmur, click or rub, no peripheral edema and peripheral pulses strong  MS: no gross musculoskeletal defects noted, no edema  NEURO: Normal strength and tone, mentation intact and speech normal  PSYCH: mentation appears normal, affect normal/bright    Results for orders placed or performed in visit on 04/18/23   Rheumatoid factor     Status: Normal   Result Value Ref Range    Rheumatoid Factor <7 <12 IU/mL   Anti Nuclear Neetu IgG by IFA with Reflex     Status: Normal   Result Value Ref Range    DARY interpretation Negative Negative   Cyclic Citrullinated Peptide Antibody IgG     Status: Normal   Result Value Ref Range    Cyclic Citrullinated Peptide Antibody IgG 1.5 <7.0 U/mL    TSH with free T4 reflex     Status: Normal   Result Value Ref Range    TSH 1.11 0.30 - 4.20 uIU/mL

## 2023-04-19 LAB
ANA SER QL IF: NEGATIVE
CCP AB SER IA-ACNC: 1.5 U/ML
RHEUMATOID FACT SER NEPH-ACNC: <7 IU/ML

## 2023-04-22 ENCOUNTER — MYC MEDICAL ADVICE (OUTPATIENT)
Dept: FAMILY MEDICINE | Facility: CLINIC | Age: 33
End: 2023-04-22
Payer: COMMERCIAL

## 2023-04-22 DIAGNOSIS — L30.9 PERIORBITAL DERMATITIS: Primary | ICD-10-CM

## 2023-08-30 ENCOUNTER — HOSPITAL ENCOUNTER (EMERGENCY)
Facility: CLINIC | Age: 33
Discharge: HOME | End: 2023-08-30
Attending: EMERGENCY MEDICINE | Admitting: EMERGENCY MEDICINE
Payer: COMMERCIAL

## 2023-08-30 ENCOUNTER — APPOINTMENT (OUTPATIENT)
Dept: CT IMAGING | Facility: CLINIC | Age: 33
End: 2023-08-30
Attending: EMERGENCY MEDICINE
Payer: COMMERCIAL

## 2023-08-30 VITALS
DIASTOLIC BLOOD PRESSURE: 76 MMHG | SYSTOLIC BLOOD PRESSURE: 124 MMHG | HEART RATE: 84 BPM | RESPIRATION RATE: 18 BRPM | OXYGEN SATURATION: 96 % | TEMPERATURE: 97.8 F

## 2023-08-30 DIAGNOSIS — F10.920 ALCOHOLIC INTOXICATION WITHOUT COMPLICATION (H): ICD-10-CM
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
## 2023-08-30 PROCEDURE — 70450 CT HEAD/BRAIN W/O DYE: CPT

## 2023-08-30 PROCEDURE — 72125 CT NECK SPINE W/O DYE: CPT

## 2023-08-30 PROCEDURE — 99284 EMERGENCY DEPT VISIT MOD MDM: CPT | Mod: 25

## 2023-08-30 ASSESSMENT — ACTIVITIES OF DAILY LIVING (ADL): ADLS_ACUITY_SCORE: 35

## 2023-08-30 NOTE — Clinical Note
Chanel Cotto was seen and treated in our emergency department on 8/30/2023.  She may return to work on 08/31/2023.       If you have any questions or concerns, please don't hesitate to call.      Nai Kirkland MD

## 2023-08-31 NOTE — ED TRIAGE NOTES
"Had quiet a lot to drink last night.  Has 2 \"eye opening\" shots  this morning.  One at 7am and 1 at 9am.  Remembers driving to the second house and cleaning the upstair, then remembers getting woken up by an officer at the bottom of the stairs.  She thinks she might have stepped wrong and fell.  Is unsure if she hit her head.  She is in no pain and no distress.       Triage Assessment       Row Name 08/30/23 1920       Triage Assessment (Adult)    Airway WDL WDL       Respiratory WDL    Respiratory WDL WDL       Skin Circulation/Temperature WDL    Skin Circulation/Temperature WDL WDL       Cardiac WDL    Cardiac WDL WDL       Peripheral/Neurovascular WDL    Peripheral Neurovascular WDL WDL       Cognitive/Neuro/Behavioral WDL    Cognitive/Neuro/Behavioral WDL orientation    Orientation person;time;situation                    "
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
## 2023-09-26 ENCOUNTER — HOSPITAL ENCOUNTER (EMERGENCY)
Facility: CLINIC | Age: 33
Discharge: HOME | End: 2023-09-27
Attending: STUDENT IN AN ORGANIZED HEALTH CARE EDUCATION/TRAINING PROGRAM | Admitting: STUDENT IN AN ORGANIZED HEALTH CARE EDUCATION/TRAINING PROGRAM
Payer: COMMERCIAL

## 2023-09-26 ENCOUNTER — APPOINTMENT (OUTPATIENT)
Dept: RADIOLOGY | Facility: CLINIC | Age: 33
End: 2023-09-26
Attending: STUDENT IN AN ORGANIZED HEALTH CARE EDUCATION/TRAINING PROGRAM
Payer: COMMERCIAL

## 2023-09-26 VITALS
SYSTOLIC BLOOD PRESSURE: 140 MMHG | TEMPERATURE: 98.4 F | RESPIRATION RATE: 18 BRPM | HEART RATE: 105 BPM | WEIGHT: 130 LBS | OXYGEN SATURATION: 100 % | BODY MASS INDEX: 20.4 KG/M2 | DIASTOLIC BLOOD PRESSURE: 90 MMHG | HEIGHT: 67 IN

## 2023-09-26 DIAGNOSIS — S01.21XA LACERATION OF NOSE, INITIAL ENCOUNTER: ICD-10-CM

## 2023-09-26 DIAGNOSIS — S02.2XXB OPEN FRACTURE OF NASAL BONE, INITIAL ENCOUNTER: ICD-10-CM

## 2023-09-26 PROCEDURE — 250N000011 HC RX IP 250 OP 636: Performed by: STUDENT IN AN ORGANIZED HEALTH CARE EDUCATION/TRAINING PROGRAM

## 2023-09-26 PROCEDURE — 99284 EMERGENCY DEPT VISIT MOD MDM: CPT

## 2023-09-26 PROCEDURE — 271N000002 HC RX 271: Performed by: STUDENT IN AN ORGANIZED HEALTH CARE EDUCATION/TRAINING PROGRAM

## 2023-09-26 PROCEDURE — 12011 RPR F/E/E/N/L/M 2.5 CM/<: CPT

## 2023-09-26 PROCEDURE — 70160 X-RAY EXAM OF NASAL BONES: CPT

## 2023-09-26 PROCEDURE — 250N000009 HC RX 250: Performed by: STUDENT IN AN ORGANIZED HEALTH CARE EDUCATION/TRAINING PROGRAM

## 2023-09-26 RX ORDER — METHYLCELLULOSE 4000CPS 30 %
POWDER (GRAM) MISCELLANEOUS ONCE
Status: COMPLETED | OUTPATIENT
Start: 2023-09-26 | End: 2023-09-26

## 2023-09-26 RX ADMIN — Medication: at 22:11

## 2023-09-26 RX ADMIN — EPINEPHRINE BITARTRATE 3 ML: 1 POWDER at 22:11

## 2023-09-27 PROCEDURE — 250N000009 HC RX 250: Performed by: STUDENT IN AN ORGANIZED HEALTH CARE EDUCATION/TRAINING PROGRAM

## 2023-09-27 PROCEDURE — 250N000013 HC RX MED GY IP 250 OP 250 PS 637: Performed by: STUDENT IN AN ORGANIZED HEALTH CARE EDUCATION/TRAINING PROGRAM

## 2023-09-27 RX ORDER — CEPHALEXIN 500 MG/1
500 CAPSULE ORAL ONCE
Status: COMPLETED | OUTPATIENT
Start: 2023-09-27 | End: 2023-09-27

## 2023-09-27 RX ORDER — BACITRACIN ZINC 500 [USP'U]/G
OINTMENT TOPICAL 2 TIMES DAILY
Qty: 28 G | Refills: 0 | Status: SHIPPED | OUTPATIENT
Start: 2023-09-27 | End: 2023-10-10

## 2023-09-27 RX ORDER — GINSENG 100 MG
CAPSULE ORAL ONCE
Status: COMPLETED | OUTPATIENT
Start: 2023-09-27 | End: 2023-09-27

## 2023-09-27 RX ORDER — CEPHALEXIN 500 MG/1
500 CAPSULE ORAL 4 TIMES DAILY
Qty: 28 CAPSULE | Refills: 0 | Status: SHIPPED | OUTPATIENT
Start: 2023-09-27 | End: 2023-10-04

## 2023-09-27 RX ADMIN — BACITRACIN: 500 OINTMENT TOPICAL at 00:53

## 2023-09-27 RX ADMIN — CEPHALEXIN 500 MG: 500 CAPSULE ORAL at 00:53

## 2023-09-27 NOTE — ED TRIAGE NOTES
PT states that around 2130 she was playing with her dog and they cracked heads together. Pt has a lac to bridge of nose and bruising.   No OTC PTA    Bleeding is controlled in triage.      Triage Assessment       Row Name 09/26/23 2201       Triage Assessment (Adult)    Airway WDL WDL       Respiratory WDL    Respiratory WDL WDL       Skin Circulation/Temperature WDL    Skin Circulation/Temperature WDL WDL       Cardiac WDL    Cardiac WDL WDL       Peripheral/Neurovascular WDL    Peripheral Neurovascular WDL WDL       Cognitive/Neuro/Behavioral WDL    Cognitive/Neuro/Behavioral WDL WDL

## 2023-09-27 NOTE — ED PROVIDER NOTES
EMERGENCY DEPARTMENT ENCOUNTER       ED Course & Medical Decision Making     Final Impression  33 year old female sent for evaluation of a nasal.  Patient was playing with her dog in they bumped heads while playing and patient sustained a laceration and pain to her nose.  On examination patient has a 2 cm laceration across the bridge of her nose does have some moderate swelling and bruising developing, mild nasal bridge instability.  No septal hematoma though does have some trace blood in the right nostril.  Facial x-ray confirms nasal bone fracture.  Laceration repaired as documented below.  Technically an open fracture this will be started on Keflex, also provided with bacitracin ointment.  Discussed wound care.  Discussed following up with ENT for likely surgical repair in the next 2 weeks.  All questions answered, will discharge home with significant other.    Prior to making a final disposition on this patient the results of patient's tests and other diagnostic studies were discussed with the patient. All questions were answered. Patient expressed understanding of the plan and was amenable to it.    Medical Decision Making    History:  Supplemental history from: Significant other  External Record(s) reviewed as documented below;  [place] immunization registry, last tetanus booster was 11/15/2018    Work Up:  Chart documentation includes differential considered and any EKGs or imaging independently interpreted by provider, where specified.  DDx considered but not limited to: Nasal laceration, nasal bone fracture, septal hematoma, epistaxis  Administered antibiotics for: Open fracture of the nose    Complicating factors:  Care impacted by chronic illness: PTSD, anorexia, GERD, ADHD  Care affected by social determinants of health: N/A    Disposition considerations: Discharge. I prescribed additional prescription strength medication(s) as charted. See documentation for any additional details.      Medications  "  cephALEXin (KEFLEX) capsule 500 mg (has no administration in time range)   bacitracin ointment (has no administration in time range)   lidocaine/EPINEPHrine/tetracaine (LET) solution KIT (3 mLs Topical $Given 9/26/23 2211)   methylcellulose powder ( Topical $Given 9/26/23 2211)       New Prescriptions    BACITRACIN 500 UNIT/GM EXTERNAL OINTMENT    Apply topically 2 times daily    CEPHALEXIN (KEFLEX) 500 MG CAPSULE    Take 1 capsule (500 mg) by mouth 4 times daily for 7 days       Final Impression     1. Laceration of nose, initial encounter    2. Open fracture of nasal bone, initial encounter        PROCEDURE: Laceration Repair   INDICATIONS: Laceration   PROCEDURE PROVIDER: Dr Antonio Quiñones   SITE: Nasal bridge   TYPE/SIZE: complex, clean, and no foreign body visualized  2.0 cm (total length), L-shaped across bridge of nose   FUNCTIONAL ASSESSMENT: Distal sensation, circulation, and motor intact   MEDICATION: LET   PREPARATION: scrubbing with Normal saline   DEBRIDEMENT: no debridement and wound explored, no foreign body found   CLOSURE:  Superficial layer closed with 6 stitches of 5-0 Fast Absorbing simple interrupted       Chief Complaint     Chief Complaint   Patient presents with    Facial Injury     PT states that around 2130 she was playing with her dog and they cracked heads together. Pt has a lac to bridge of nose and bruising.   No OTC PTA    Bleeding is controlled in triage.     HPI     Chanel Cotto is a 33 year old female who presents for evaluation of nasal bridge laceration.      Physical Exam     BP (!) 140/90   Pulse 105   Temp 98.4  F (36.9  C) (Oral)   Resp 18   Ht 1.702 m (5' 7\")   Wt 59 kg (130 lb)   LMP 08/16/2023   SpO2 100%   BMI 20.36 kg/m    Constitutional: Awake, alert, in no acute distress.  Head: Normocephalic, atraumatic.  ENT: Mucous membranes moist.  2.0 cm L-shaped laceration across the upper bridge of the nose.  Moderate swelling and mild bruising across the bridge " of the nose.  Trace blood in the right nostril, no septal hematoma visualized on either side  Eyes: Conjunctiva normal.  Respiratory: Respirations even, unlabored, in no acute respiratory distress  Musculoskeletal: Moves all 4 extremities equally.  Integument: Warm, dry.  Neurologic: Alert & oriented x 3. Normal speech. Grossly normal motor and sensory function. No focal deficits noted.  Psychiatric: Normal mood    Labs & Imaging     Imaging reviewed and independently interpreted as below;   XR nasal bone 3 view images personally reviewed, does show a nasal bone fracture at the bridge.  Results for orders placed or performed during the hospital encounter of 09/26/23   XR Nasal Bones 3 Views    Impression    IMPRESSION: Mildly displaced acute nasal bone fractures.        Antonio Quiñones MD  09/27/23 0048

## 2023-09-28 ENCOUNTER — PATIENT OUTREACH (OUTPATIENT)
Dept: CARE COORDINATION | Facility: CLINIC | Age: 33
End: 2023-09-28
Payer: COMMERCIAL

## 2023-09-28 NOTE — LETTER
[facility] CARE COORDINATION  [address] [address]    September 28, 2023    Chanel Cotto  [address] [address] [address]      Dear Chanel,        I am a  clinic community health worker who works with Xu Sales MD with the [facility]. I wanted to thank you for spending the time to talk with me.  Below is a description of clinic care coordination and how we can further assist you.       The clinic care coordination team is made up of a registered nurse, , financial resource worker and community health worker who understand the health care system. The goal of clinic care coordination is to help you manage your health and improve access to the health care system. Our team works alongside your provider to assist you in determining your health and social needs. We can help you obtain health care and community resources, providing you with necessary information and education. We can work with you through any barriers and develop a care plan that helps coordinate and strengthen the communication between you and your care team.  Our services are voluntary and are offered without charge to you personally.    If you wish to connect with the Clinic Care Coordination Team, please let your [facility] Primary Care Provider or Clinic Care Team know and they can place a referral. The Clinic Care Coordination team will then reach out by phone to further support you.    We are focused on providing you with the highest-quality healthcare experience possible.    Sincerely,   Your care team with [facility]

## 2023-09-28 NOTE — PROGRESS NOTES
Clinic Care Coordination Contact  Community Health Worker Initial Outreach    CHW Initial Information Gathering:  Referral Source: ED Follow-Up  Preferred Hospital: [facility]  [phone]  Preferred Urgent Care: Other ([facility] [place])  No PCP office visit in Past Year: No       Patient accepts CC: No, due to the patient stating that at this time there are no concerns or questions for CCC to assist with. Patient will be sent Care Coordination introduction letter for future reference.     EAMON Ash  [phone]  [facility]  [facility]

## 2023-10-03 ENCOUNTER — TELEPHONE (OUTPATIENT)
Dept: OTOLARYNGOLOGY | Facility: CLINIC | Age: 33
End: 2023-10-03
Payer: COMMERCIAL

## 2023-10-03 NOTE — TELEPHONE ENCOUNTER
If this patient chooses to schedule with us - Please schedule [place] [facility]

## 2023-10-09 ENCOUNTER — OFFICE VISIT (OUTPATIENT)
Dept: OTOLARYNGOLOGY | Facility: CLINIC | Age: 33
End: 2023-10-09
Payer: COMMERCIAL

## 2023-10-09 VITALS — SYSTOLIC BLOOD PRESSURE: 136 MMHG | HEART RATE: 90 BPM | DIASTOLIC BLOOD PRESSURE: 84 MMHG

## 2023-10-09 DIAGNOSIS — S02.2XXB OPEN FRACTURE OF NASAL BONE, INITIAL ENCOUNTER: ICD-10-CM

## 2023-10-09 DIAGNOSIS — S02.2XXA NASAL FRACTURE: Primary | ICD-10-CM

## 2023-10-09 PROCEDURE — 99204 OFFICE O/P NEW MOD 45 MIN: CPT | Performed by: STUDENT IN AN ORGANIZED HEALTH CARE EDUCATION/TRAINING PROGRAM

## 2023-10-09 NOTE — NURSING NOTE
Chanel Cotto's chief complaint for this visit includes:  Chief Complaint   Patient presents with    Consult     Nasal fracture. DOI 9-26-23, seen in ER Xray showed non displaced fx.      PCP: Xu Sales    Referring Provider:  Referred Self, MD  No address on file    /84   Pulse 90   LMP 08/16/2023
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
## 2023-10-10 ENCOUNTER — OFFICE VISIT (OUTPATIENT)
Dept: FAMILY MEDICINE | Facility: CLINIC | Age: 33
End: 2023-10-10
Payer: COMMERCIAL

## 2023-10-10 VITALS
RESPIRATION RATE: 18 BRPM | BODY MASS INDEX: 19.55 KG/M2 | HEART RATE: 87 BPM | HEIGHT: 68 IN | TEMPERATURE: 98.6 F | WEIGHT: 129 LBS | SYSTOLIC BLOOD PRESSURE: 133 MMHG | DIASTOLIC BLOOD PRESSURE: 87 MMHG | OXYGEN SATURATION: 98 %

## 2023-10-10 DIAGNOSIS — Z01.818 PRE-OPERATIVE GENERAL PHYSICAL EXAMINATION: Primary | ICD-10-CM

## 2023-10-10 DIAGNOSIS — Z30.09 BIRTH CONTROL COUNSELING: ICD-10-CM

## 2023-10-10 DIAGNOSIS — F17.210 CIGARETTE SMOKER: ICD-10-CM

## 2023-10-10 DIAGNOSIS — Z86.59 HISTORY OF ADHD: ICD-10-CM

## 2023-10-10 DIAGNOSIS — Z86.59 HISTORY OF POSTTRAUMATIC STRESS DISORDER (PTSD): ICD-10-CM

## 2023-10-10 DIAGNOSIS — S02.2XXS OPEN FRACTURE OF NASAL BONE, SEQUELA: ICD-10-CM

## 2023-10-10 DIAGNOSIS — Z86.59 HISTORY OF ANOREXIA NERVOSA: ICD-10-CM

## 2023-10-10 DIAGNOSIS — K90.41 NCGS (NON-CELIAC GLUTEN SENSITIVITY): ICD-10-CM

## 2023-10-10 LAB
ALBUMIN SERPL BCG-MCNC: 4.6 G/DL (ref 3.5–5.2)
ALP SERPL-CCNC: 62 U/L (ref 35–104)
ALT SERPL W P-5'-P-CCNC: 18 U/L (ref 0–50)
ANION GAP SERPL CALCULATED.3IONS-SCNC: 13 MMOL/L (ref 7–15)
AST SERPL W P-5'-P-CCNC: 38 U/L (ref 0–45)
BASO+EOS+MONOS # BLD AUTO: NORMAL 10*3/UL
BASO+EOS+MONOS NFR BLD AUTO: NORMAL %
BASOPHILS # BLD AUTO: 0.1 10E3/UL (ref 0–0.2)
BASOPHILS NFR BLD AUTO: 1 %
BILIRUB SERPL-MCNC: 0.2 MG/DL
BUN SERPL-MCNC: 7.2 MG/DL (ref 6–20)
CALCIUM SERPL-MCNC: 9.5 MG/DL (ref 8.6–10)
CHLORIDE SERPL-SCNC: 103 MMOL/L (ref 98–107)
CREAT SERPL-MCNC: 0.61 MG/DL (ref 0.51–0.95)
DEPRECATED HCO3 PLAS-SCNC: 27 MMOL/L (ref 22–29)
EGFRCR SERPLBLD CKD-EPI 2021: >90 ML/MIN/1.73M2
EOSINOPHIL # BLD AUTO: 0 10E3/UL (ref 0–0.7)
EOSINOPHIL NFR BLD AUTO: 1 %
ERYTHROCYTE [DISTWIDTH] IN BLOOD BY AUTOMATED COUNT: 14.2 % (ref 10–15)
GLUCOSE SERPL-MCNC: 87 MG/DL (ref 70–99)
HCG UR QL: NEGATIVE
HCT VFR BLD AUTO: 44.1 % (ref 35–47)
HGB BLD-MCNC: 14.7 G/DL (ref 11.7–15.7)
IMM GRANULOCYTES # BLD: 0 10E3/UL
IMM GRANULOCYTES NFR BLD: 0 %
LYMPHOCYTES # BLD AUTO: 1.4 10E3/UL (ref 0.8–5.3)
LYMPHOCYTES NFR BLD AUTO: 24 %
MCH RBC QN AUTO: 32.2 PG (ref 26.5–33)
MCHC RBC AUTO-ENTMCNC: 33.3 G/DL (ref 31.5–36.5)
MCV RBC AUTO: 97 FL (ref 78–100)
MONOCYTES # BLD AUTO: 0.7 10E3/UL (ref 0–1.3)
MONOCYTES NFR BLD AUTO: 12 %
NEUTROPHILS # BLD AUTO: 3.7 10E3/UL (ref 1.6–8.3)
NEUTROPHILS NFR BLD AUTO: 62 %
PLATELET # BLD AUTO: 213 10E3/UL (ref 150–450)
POTASSIUM SERPL-SCNC: 4.3 MMOL/L (ref 3.4–5.3)
PROT SERPL-MCNC: 7.6 G/DL (ref 6.4–8.3)
RBC # BLD AUTO: 4.57 10E6/UL (ref 3.8–5.2)
SODIUM SERPL-SCNC: 143 MMOL/L (ref 135–145)
WBC # BLD AUTO: 6 10E3/UL (ref 4–11)

## 2023-10-10 PROCEDURE — 85025 COMPLETE CBC W/AUTO DIFF WBC: CPT | Performed by: FAMILY MEDICINE

## 2023-10-10 PROCEDURE — 80053 COMPREHEN METABOLIC PANEL: CPT | Performed by: FAMILY MEDICINE

## 2023-10-10 PROCEDURE — 99215 OFFICE O/P EST HI 40 MIN: CPT | Performed by: FAMILY MEDICINE

## 2023-10-10 PROCEDURE — 36415 COLL VENOUS BLD VENIPUNCTURE: CPT | Performed by: FAMILY MEDICINE

## 2023-10-10 PROCEDURE — 81025 URINE PREGNANCY TEST: CPT | Performed by: FAMILY MEDICINE

## 2023-10-10 RX ORDER — LEVONORGESTREL/ETHIN.ESTRADIOL 0.1-0.02MG
1 TABLET ORAL DAILY
COMMUNITY

## 2023-10-10 ASSESSMENT — PAIN SCALES - GENERAL: PAINLEVEL: MILD PAIN (2)

## 2023-10-10 ASSESSMENT — SOCIAL DETERMINANTS OF HEALTH (SDOH)
WITHIN THE LAST YEAR, HAVE YOU BEEN HUMILIATED OR EMOTIONALLY ABUSED IN OTHER WAYS BY YOUR PARTNER OR EX-PARTNER?: YES
WITHIN THE LAST YEAR, HAVE YOU BEEN KICKED, HIT, SLAPPED, OR OTHERWISE PHYSICALLY HURT BY YOUR PARTNER OR EX-PARTNER?: YES
WITHIN THE LAST YEAR, HAVE TO BEEN RAPED OR FORCED TO HAVE ANY KIND OF SEXUAL ACTIVITY BY YOUR PARTNER OR EX-PARTNER?: NO
WITHIN THE LAST YEAR, HAVE YOU BEEN AFRAID OF YOUR PARTNER OR EX-PARTNER?: YES
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
## 2023-10-10 NOTE — COMMUNITY RESOURCES LIST (ENGLISH)
10/10/2023   [facility]  N/A  For questions about this resource list or additional care needs, please contact your primary care clinic or care manager.  Phone: [phone]   Email: N/A   Address: [address] [place] [address]   Hours: N/A        Financial Stability       Rent and mortgage payment assistance  1  [facility] [employer] [facility] - [facility] - Rent payment assistance Distance: 1.72 miles      In-Person, Phone/Virtual   [address] [place] [address]  Language: English  Hours: Mon - Fri 8:00 AM - 12:00 PM , Mon - Fri 1:00 PM - 4:00 PM  Fees: Free   Phone: (780) 862-6063 Email: lisha@[facility] Website: https://[facility].org/[facility]/social-services-office-[place]/     2  [facility] [place] [facility] Housing Choice Voucher Program Distance: 1.85 miles      Phone/Virtual   [address] [place] [address]  Language: English  Hours: Mon - Fri 8:00 AM - 4:30 PM  Fees: Free   Phone: (360) 791-3242 Email: office@[employer].org Website: http://[employer].org     Utility payment assistance  3  [employer] ([facility]) [facility] - Energy Assistance Distance: 1.4 miles      Phone/Virtual   [address] [place] [address]  Language: English, Hmong, Turks and Ca[place] Islander, Turkish  Hours: Mon - Fri 8:00 AM - 12:00 PM , Mon - Fri 1:00 PM - 4:30 PM  Fees: Free   Phone: (933) 755-9670 Email: sheri@[employer].org Website: http://www.caprw.org     4  [facility] [employer] [facility] - [facility] - [facility] Distance: 1.72 miles      In-Person, Phone/Virtual   [address] [place] [address]  Language: English  Hours: Mon - Fri 8:00 AM - 12:00 PM , Mon - Fri 1:00 PM - 4:00 PM  Fees: Free   Phone: (495) 299-1542 Email: [email or web address]@[facility].org Website:  https://[facility]/social-services-office-[place]/          Food and Nutrition       Food pantry  5  [employer] Market Distance: 1.37 miles      Pickup   [address] [place] [address]  Language: English, Arabic  Hours: Thu 2:00 PM - 6:00 PM , Fri 10:00 AM - 2:00 PM  Fees: Free   Phone: (205) 573-3382 Email: nanyc@[employer] Website: https://[employer]/     6  [facility] - Food Distribution Program Distance: 2.27 miles      In-Person   [address] [place] [address]  Language: English, Hmong, Arabic  Hours: Tue 3:00 PM - 5:00 PM  Fees: Free   Phone: (182) 135-9720 Email: info@[employer] Website: http://[facility] [employer]/programs/[facility]/     SNAP application assistance  7  [facility] - Economic Support [facility] Distance: 2.64 miles      In-Person, Phone/Virtual   [address] [place] [address]  Language: English  Hours: Mon - Fri 8:00 AM - 4:30 PM  Fees: Free   Phone: (993) 874-4526 Email: raimundo@[facility]. Website: https://www.[facility]./787/Economic-Support     8  [facility] - SNAP Outreach Distance: 5.48 miles      Phone/Virtual   [address] [place] [address]  Language: Belarusian, English, Hmong, Mindy, Arabic  Hours: Mon - Fri 10:00 AM - 12:00 PM , Mon - Fri 2:00 PM - 4:00 PM  Fees: Free   Phone: (634) 394-8009 Website: https://[facility].org/     Soup kitchen or free meals  9  [facility] - [employer] Distance: 4.76 miles      Pickup   [address] [place] [address]  Language: English  Hours: Wed 5:30 PM - 6:30 PM  Fees: Free   Phone: (972) 853-2342 Email: office@[facility].org Website: https://www.[facility].org     [address] [place] [address] [employer] Meals Distance: 5.31 miles      [facility]    [address] [place] [address]  Language: English  Hours: Mon 3:00 PM - 8:00 PM , Tue - Fri 5:00 AM - 8:00 PM , Sat 7:00 AM - 2:00 PM  Fees: Free   Phone: (406) 238-1765 Website: https://communityed.[employer].org/          Important Numbers & Websites       Emergency Services   911  [facility]   311  Poison Control   (618) 908-2255  Suicide Prevention Lifeline   (887) 157-7358 (TALK)  Child Abuse Hotline   (900) 321-2514 (4-A-Child)  Sexual Assault Hotline   (610) 834-5814 (HOPE)  National Runaway Safeline   (811) 339-9414 (RUNAWAY)  All-Options Talkline   (942) 375-9427  Substance Abuse Referral   (275) 946-2508 (HELP)
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
## 2023-10-10 NOTE — COMMUNITY RESOURCES LIST (ENGLISH)
10/10/2023   [facility]  N/A  For questions about this resource list or additional care needs, please contact your primary care clinic or care manager.  Phone: [phone]   Email: N/A   Address: [address] [place] [address]   Hours: N/A        Financial Stability       Rent and mortgage payment assistance  1  [facility] [employer] [facility] - Rent payment assistance Distance: 1.72 miles      In-Person, Phone/Virtual   [address] [place] [address]  Language: English  Hours: Mon - Fri 8:00 AM - 12:00 PM , Mon - Fri 1:00 PM - 4:00 PM  Fees: Free   Phone: (880) 651-8024 Email: lisha@[facility] [place].[facility] Website: https://[facility]Crown[facility].org/[facility]/social-services-office-[place]/     2  [facility] Housing Choice Voucher Program Distance: 1.85 miles      Phone/Virtual   [address] [place] [address]  Language: English  Hours: Mon - Fri 8:00 AM - 4:30 PM  Fees: Free   Phone: (881) 731-8698 Email: office@[place]Automile.org Website: http://Lean[employer].org     Utility payment assistance  3  [employer] ([facility]) [facility] - Energy Assistance Distance: 1.4 miles      Phone/Virtual   [address] [place] [address]  Language: English, Hmong, Bolivian, Mongolian  Hours: Mon - Fri 8:00 AM - 12:00 PM , Mon - Fri 1:00 PM - 4:30 PM  Fees: Free   Phone: (658) 373-6761 Email: sheri@caprw.org Website: http://www.caprw.org     4  [facility] [employer] [facility] - [facility] Distance: 1.72 miles      In-Person, Phone/Virtual   [address] [place] [address]  Language: English  Hours: Mon - Fri 8:00 AM - 12:00 PM , Mon - Fri 1:00 PM - 4:00 PM  Fees: Free   Phone: (808) 268-1673 Email: [email or web address]@[facility] [place].[facility].org Website:  https://central[facility]/social-services-office-[place]/          Food and Nutrition       Food pantry  5  [employer] - Drive Up Food Market Distance: 1.37 miles      Pickup   [address] [place] [address]  Language: English, Greek  Hours: Thu 2:00 PM - 6:00 PM , Fri 10:00 AM - 2:00 PM  Fees: Free   Phone: (862) 392-1542 Email: nancy@[employer] Website: https://Spectrum5/     6  [facility] - Food Distribution Program Distance: 2.27 miles      In-Person   [address] [place] [address]  Language: English, Hmong, Greek  Hours: Tue 3:00 PM - 5:00 PM  Fees: Free   Phone: (763) 156-1577 Email: info@[employer] Website: http://[employer].Q[employer]/programs/[facility]/     SNAP application assistance  7  [facility] - Economic Support [facility] Distance: 2.64 miles      In-Person, Phone/Virtual   [address] [place] [address]  Language: English  Hours: Mon - Fri 8:00 AM - 4:30 PM  Fees: Free   Phone: (226) 896-8704 Email: raimundo@[facility]. Website: https://www.[facility]./787/Economic-Support     8  [facility] - SNAP Outreach Distance: 5.48 miles      Phone/Virtual   [address] [place] [address]  Language: Azeri, English, Hmong, Mindy, Greek  Hours: Mon - Fri 10:00 AM - 12:00 PM , Mon - Fri 2:00 PM - 4:00 PM  Fees: Free   Phone: (726) 213-8357 Website: https://[facility].org/     Soup kitchen or free meals  9  [facility] - [employer] - Loaves and Fishes Distance: 4.76 miles      Pickup   [address] [facility] [place] [address]  Language: English  Hours: Wed 5:30 PM - 6:30 PM  Fees: Free   Phone: (579) 115-7933 Email: office@[facility].org Website: https://www.[facility].org     [address] [place] [address] [employer] Meals Distance: 5.31 miles      [facility]    [address] [place] [address]  Language: English  Hours: Mon 3:00 PM - 8:00 PM , Tue - Fri 5:00 AM - 8:00 PM , Sat 7:00 AM - 2:00 PM  Fees: Free   Phone: (999) 932-2428 Website: https://communityed.[employer]ola[employer].org/          Important Numbers & Websites       Emergency Services   911  [facility] Services   311  Poison Control   (954) 930-3953  Suicide Prevention Lifeline   (350) 279-7901 (TALK)  Child Abuse Hotline   (687) 871-4749 (4-A-Child)  Sexual Assault Hotline   (768) 994-9973 (HOPE)  National Runaway Safeline   (926) 463-5040 (RUNAWAY)  All-Options Talkline   (920) 286-7005  Substance Abuse Referral   (326) 636-3554 (HELP)

## 2023-10-10 NOTE — COMMUNITY RESOURCES LIST (ENGLISH)
10/10/2023   [facility]  N/A  For questions about this resource list or additional care needs, please contact your primary care clinic or care manager.  Phone: [phone]   Email: N/A   Address: [address] [place] [address]   Hours: N/A        Financial Stability       Rent and mortgage payment assistance  1  The [facility] - Rent payment assistance Distance: 1.72 miles      In-Person, Phone/Virtual   [address] [place] [address]  Language: English  Hours: Mon - Fri 8:00 AM - 12:00 PM , Mon - Fri 1:00 PM - 4:00 PM  Fees: Free   Phone: (918) 622-1542 Email: lisha@[facility] Website: https://centralNew Sunrise Regional Treatment Center.[facility].org/[facility]/social-services-office-[place]/     2  [facility] - [facility] Housing Choice Voucher Program Distance: 1.85 miles      Phone/Virtual   [address] [place] [address]  Language: English  Hours: Mon - Fri 8:00 AM - 4:30 PM  Fees: Free   Phone: (841) 792-3671 Email: office@washingtoneSNF.org Website: http://[employer].org     Utility payment assistance  3  Community [employer] Partnership ([facility]) [facility] - Energy Assistance Distance: 1.4 miles      Phone/Virtual   [address] [place] [address]  Language: English, Hmong, Croatian, Maori  Hours: Mon - Fri 8:00 AM - 12:00 PM , Mon - Fri 1:00 PM - 4:30 PM  Fees: Free   Phone: (719) 991-9198 Email: sheri@caprw.org Website: http://www.caprw.org     4  [facility] - [facility] Distance: 1.72 miles      In-Person, Phone/Virtual   [address] [place] [address]  Language: English  Hours: Mon - Fri 8:00 AM - 12:00 PM , Mon - Fri 1:00 PM - 4:00 PM  Fees: Free   Phone: (230) 904-2715 Email: lisha@[facility].salvationarmy.org Website:  https://centralGallup Indian Medical Center.[facility]/social-services-office-[place]/          Food and Nutrition       Food pantry  5  [employer] - Drive Up Food Market Distance: 1.37 miles      Pickup   [address] [place] [address]  Language: English, Kazakh  Hours: Thu 2:00 PM - 6:00 PM , Fri 10:00 AM - 2:00 PM  Fees: Free   Phone: (674) 337-4286 Email: nancy@[employer] Website: https://[employer]./     6  [facility] - Food Distribution Program Distance: 2.27 miles      In-Person   [address] [place] [address]  Language: English, Hmong, Kazakh  Hours: Tue 3:00 PM - 5:00 PM  Fees: Free   Phone: (801) 140-1013 Email: info@[employer] Website: http://[facility].[employer]Link/programs/[facility]/     SNAP application assistance  7  [facility] - Economic Support [facility] Distance: 2.64 miles      In-Person, Phone/Virtual   [address] [place] [address]  Language: English  Hours: Mon - Fri 8:00 AM - 4:30 PM  Fees: Free   Phone: (952) 958-3167 Email: raimundo@[facility]. Website: https://www.[facility]./787/Economic-Support     8  [facility] - SNAP Outreach Distance: 5.48 miles      Phone/Virtual   [address] [place] [address]  Language: Upper sorbian, English, Hmong, Mindy, Kazakh  Hours: Mon - Fri 10:00 AM - 12:00 PM , Mon - Fri 2:00 PM - 4:00 PM  Fees: Free   Phone: (126) 987-9681 Website: https://[facility].org/     Soup kitchen or free meals  9  [facility] - Loaves and Fishes - Loaves and Fishes Distance: 4.76 miles      Pickup   [address] [facility] [place] [address]  Language: English  Hours: Wed 5:30 PM - 6:30 PM  Fees: Free   Phone: (873) 298-4752 Email: office@orMiss[facility] Delta Medical Center.org Website: https://www.[facility].org     [address] [place] [address] Take 'n Bake Meals Distance: 5.31 miles      [facility]    [address] [place] [address]  Language: English  Hours: Mon 3:00 PM - 8:00 PM , Tue - Fri 5:00 AM - 8:00 PM , Sat 7:00 AM - 2:00 PM  Fees: Free   Phone: (179) 555-9416 Website: https://communityed.[employer].org/          Important Numbers & Websites       Emergency Services   911  [facility] Services   311  Poison Control   (926) 651-9318  Suicide Prevention Lifeline   (109) 704-9503 (TALK)  Child Abuse Hotline   (729) 672-9783 (4-A-Child)  Sexual Assault Hotline   (845) 143-5196 (HOPE)  National Runaway Safeline   (491) 208-9571 (RUNAWAY)  All-Options Talkline   (550) 220-2419  Substance Abuse Referral   (187) 507-1937 (HELP)

## 2023-10-10 NOTE — COMMUNITY RESOURCES LIST (ENGLISH)
10/10/2023   [facility]  N/A  For additional resource needs, please contact your health insurance member services or your primary care team.  Phone: [phone]   Email: N/A   Address: [address] [place] [address]   Hours: N/A        Financial Stability       Rent and mortgage payment assistance  1  The [facility]  Office - [facility] - Rent payment assistance Distance: 1.72 miles      In-Person, Phone/Virtual   [address] [place] [address]  Language: English  Hours: Mon - Fri 8:00 AM - 12:00 PM , Mon - Fri 1:00 PM - 4:00 PM  Fees: Free   Phone: (491) 499-7191 Email: lisha@[facility] Center[facility] InsuranceNorth[facility] Website: https://[facility] CenterArk/[facility]/social-services-office-[place]/     2  [facility] Housing Choice Voucher Program Distance: 1.85 miles      Phone/Virtual   [address] [place] [address]  Language: English  Hours: Mon - Fri 8:00 AM - 4:30 PM  Fees: Free   Phone: (691) 141-4042 Email: office@[employer] Website: http://[employer]     Utility payment assistance  3  [place] Public Utilities Commission - [place]'s Telephone Assistance Plan (TAP) and Federal Lifeline and Affordable Connectivity Program (ACP) Distance: 1.8 miles      Phone/Virtual   [address] [place] [address]  Language: English  Fees: Free   Phone: (217) 396-3934 Email: consumer.puc@[facility].mn. Website: https://[place].gov/puc/consumers/telephone/     4  [place] [employer] Department - Energy and Utilities Distance: 6.17 miles      In-Person, Phone/Virtual   [address] [place] [address]  Language: English  Hours: Mon - Fri 8:30 AM - 4:30 PM  Fees: Free   Phone: (440) 512-3124 Website: https://[place].gov/commerce/energy/consumer-assistance/energy-assistance-program/          Food and Nutrition       Food pantry  5  [employer] - Drive Up  Food Market Distance: 1.37 miles      Pickup   [address] [place] [address]  Language: English, Malagasy  Hours: Thu 2:00 PM - 6:00 PM , Fri 10:00 AM - 2:00 PM  Fees: Free   Phone: (575) 992-4222 Email: nancy@[employer] Website: https://AsesoriÂ­as Digitales ([employer]visors).[employer]/     6  [facility] - Food Distribution Program Distance: 2.27 miles      In-Person   [address] [place] [address]  Language: English, Hmong, Malagasy  Hours: Tue 3:00 PM - 5:00 PM  Fees: Free   Phone: (907) 126-2679 Email: info@[facility] MemorialAMIA Systems.[employer] Website: http://[facility].org/programs/ehuqwo-oyevlpuud-pcgswg/     SNAP application assistance  7  [employer] [place] Distance: 6.74 miles      Phone/Virtual   [address] [address]  Language: English, Hmong, Australian, Ugandan, Malagasy  Hours: Mon - Fri 8:30 AM - 4:30 PM  Fees: Free   Phone: (510) 223-8159 Email: helpline@hungersolutions.org Website: https://www.hungersolutions.org/programs/[place]-food-helpline/     8  [facility] Economic Support [facility] Distance: 2.64 miles      In-Person, Phone/Virtual   [address] [place] [address]  Language: English  Hours: Mon - Fri 8:00 AM - 4:30 PM  Fees: Free   Phone: (133) 164-8080 Email: raimundo@[facility].mn. Website: https://www.[facility].mn.us/787/Economic-Support     Soup kitchen or free meals  9  [facility] - Loaves and Fishes - Loaves and Fishes Distance: 4.76 miles      Pickup   [address] [place] [address]  Language: English  Hours: Wed 5:30 PM - 6:30 PM  Fees: Free   Phone: (338) 325-1406 Email: office@or[facility].org Website: https://www.funmifishesmn.org     10  [facility] - Take 'n Bake Meals Distance: 5.31 miles      Pickup   [address] [place] [address]  Language: English  Hours: Mon 3:00 PM - 8:00 PM , Tue - Fri 5:00 AM - 8:00 PM , Sat 7:00 AM -  2:00 PM  Fees: Free   Phone: (145) 816-8385 Website: https://communityed.[facility].org/          Important Numbers & Websites       [address].[facility]  Poison Control   (836) 974-4332 [place]poison.org  Suicide and Crisis Lifeline   [address] [place], [place] [address]line.org  Childhelp [place] Child Abuse Hotline   851.794.8101 Childhelphotline.org  [place] Sexual Assault Hotline   (620) 243-4596 (HOPE) [facility] Runaway Safeline   (179) 934-7436 (RUNAWAY) [facility] Hospitalrunaway.org  Pregnancy & Postpartum Support [place]   Call/text 114-396-5548 Ppsupportmn.org  Substance Abuse National Helpline ([facility]   901-578-HELP (5506) Findtreatment.gov  Emergency Services   91

## 2023-10-11 ENCOUNTER — PATIENT OUTREACH (OUTPATIENT)
Dept: CARE COORDINATION | Facility: CLINIC | Age: 33
End: 2023-10-11
Payer: COMMERCIAL

## 2023-10-11 NOTE — PROGRESS NOTES
Clinic Care Coordination Contact  [facility]/Voicemail     Clinical Data: Care Coordinator Outreach  Outreach attempted x 1. Left message on patient's voicemail with call back information and requested return call.  Plan: Care Coordinator will try to reach patient again in 1-2 business days.    KESHAV Meier/[facility]  Social Work Care Coordinator  [facility], [place], and [place]  Phone: [phone]

## 2023-10-12 ENCOUNTER — PATIENT OUTREACH (OUTPATIENT)
Dept: CARE COORDINATION | Facility: CLINIC | Age: 33
End: 2023-10-12
Payer: COMMERCIAL

## 2023-10-12 ENCOUNTER — TRANSFERRED RECORDS (OUTPATIENT)
Dept: HEALTH INFORMATION MANAGEMENT | Facility: CLINIC | Age: 33
End: 2023-10-12
Payer: COMMERCIAL

## 2023-10-12 DIAGNOSIS — Z98.890 POSTOPERATIVE STATE: Primary | ICD-10-CM

## 2023-10-12 RX ORDER — TRAMADOL HYDROCHLORIDE 50 MG/1
50 TABLET ORAL EVERY 6 HOURS PRN
Qty: 6 TABLET | Refills: 0 | Status: SHIPPED | OUTPATIENT
Start: 2023-10-12 | End: 2023-10-15

## 2023-10-12 RX ORDER — ECHINACEA PURPUREA EXTRACT 125 MG
TABLET ORAL
Qty: 44 ML | Refills: 11 | Status: SHIPPED | OUTPATIENT
Start: 2023-10-12 | End: 2023-10-23

## 2023-10-12 NOTE — LETTER
[facility] CARE COORDINATION  [address] [facility] [address]    October 12, 2023    Chanel Cotto  [address] [address]      Dear Chanel,    I am a clinic care coordinator who works with Xu Sales MD with the [facility]. I have been trying to reach you recently to introduce Clinic Care Coordination. Below is a description of clinic care coordination and how I can further assist you.       The clinic care coordination team is made up of a registered nurse, , financial resource worker and community health worker who understand the health care system. The goal of clinic care coordination is to help you manage your health and improve access to the health care system. Our team works alongside your provider to assist you in determining your health and social needs. We can help you obtain health care and community resources, providing you with necessary information and education. We can work with you through any barriers and develop a care plan that helps coordinate and strengthen the communication between you and your care team.  Our services are voluntary and are offered without charge to you personally.    Please feel free to contact me with any questions or concerns regarding care coordination and what we can offer.      We are focused on providing you with the highest-quality healthcare experience possible.    Sincerely,     KESHAV Meier/LICSW  Care Coordinator  [facility] - [facility], and [place]  Phone: [phone]

## 2023-10-12 NOTE — PROGRESS NOTES
Clinic Care Coordination Contact  [facility]/Voicemail    Clinical Data: Care Coordinator Outreach  Outreach attempted x 2. Left message on patient's voicemail with call back information and requested return call.  Plan: Care Coordinator will send care coordination introduction letter with care coordinator contact information and explanation of care coordination services via [employer]. Care Coordinator will do no further outreaches at this time.    KESHAV Meier/[facility]  Social Work Care Coordinator  [facility] [place], [place], and [place]  Phone: [phone]

## 2023-10-19 ENCOUNTER — OFFICE VISIT (OUTPATIENT)
Dept: PLASTIC SURGERY | Facility: CLINIC | Age: 33
End: 2023-10-19
Payer: COMMERCIAL

## 2023-10-19 DIAGNOSIS — Z98.890 POSTOPERATIVE STATE: Primary | ICD-10-CM
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
## 2023-10-21 ENCOUNTER — HOSPITAL ENCOUNTER (EMERGENCY)
Facility: CLINIC | Age: 33
Discharge: HOME | End: 2023-10-21
Attending: EMERGENCY MEDICINE | Admitting: EMERGENCY MEDICINE
Payer: COMMERCIAL

## 2023-10-21 ENCOUNTER — APPOINTMENT (OUTPATIENT)
Dept: CT IMAGING | Facility: CLINIC | Age: 33
End: 2023-10-21
Attending: EMERGENCY MEDICINE
Payer: COMMERCIAL

## 2023-10-21 ENCOUNTER — APPOINTMENT (OUTPATIENT)
Dept: RADIOLOGY | Facility: CLINIC | Age: 33
End: 2023-10-21
Attending: EMERGENCY MEDICINE
Payer: COMMERCIAL

## 2023-10-21 VITALS
BODY MASS INDEX: 20.4 KG/M2 | SYSTOLIC BLOOD PRESSURE: 137 MMHG | WEIGHT: 130 LBS | DIASTOLIC BLOOD PRESSURE: 73 MMHG | TEMPERATURE: 98.1 F | HEIGHT: 67 IN | OXYGEN SATURATION: 98 % | HEART RATE: 77 BPM | RESPIRATION RATE: 16 BRPM

## 2023-10-21 DIAGNOSIS — S43.491A OTHER SPRAIN OF RIGHT SHOULDER JOINT, INITIAL ENCOUNTER: ICD-10-CM

## 2023-10-21 DIAGNOSIS — F10.920 ALCOHOLIC INTOXICATION WITHOUT COMPLICATION (H): ICD-10-CM

## 2023-10-21 DIAGNOSIS — S00.83XA FOREHEAD CONTUSION, INITIAL ENCOUNTER: ICD-10-CM

## 2023-10-21 LAB
ALBUMIN SERPL BCG-MCNC: 4.8 G/DL (ref 3.5–5.2)
ALBUMIN UR-MCNC: NEGATIVE MG/DL
ALP SERPL-CCNC: 55 U/L (ref 35–104)
ALT SERPL W P-5'-P-CCNC: 14 U/L (ref 0–50)
AMPHETAMINES UR QL SCN: ABNORMAL
ANION GAP SERPL CALCULATED.3IONS-SCNC: 14 MMOL/L (ref 7–15)
APPEARANCE UR: CLEAR
AST SERPL W P-5'-P-CCNC: 25 U/L (ref 0–45)
BARBITURATES UR QL SCN: ABNORMAL
BASO+EOS+MONOS # BLD AUTO: ABNORMAL 10*3/UL
BASO+EOS+MONOS NFR BLD AUTO: ABNORMAL %
BASOPHILS # BLD AUTO: 0.1 10E3/UL (ref 0–0.2)
BASOPHILS NFR BLD AUTO: 1 %
BENZODIAZ UR QL SCN: ABNORMAL
BILIRUB SERPL-MCNC: 0.2 MG/DL
BILIRUB UR QL STRIP: NEGATIVE
BUN SERPL-MCNC: 5.4 MG/DL (ref 6–20)
BZE UR QL SCN: ABNORMAL
CALCIUM SERPL-MCNC: 8.9 MG/DL (ref 8.6–10)
CANNABINOIDS UR QL SCN: ABNORMAL
CHLORIDE SERPL-SCNC: 111 MMOL/L (ref 98–107)
COLOR UR AUTO: ABNORMAL
CREAT SERPL-MCNC: 0.54 MG/DL (ref 0.51–0.95)
DEPRECATED HCO3 PLAS-SCNC: 21 MMOL/L (ref 22–29)
EGFRCR SERPLBLD CKD-EPI 2021: >90 ML/MIN/1.73M2
EOSINOPHIL # BLD AUTO: 0 10E3/UL (ref 0–0.7)
EOSINOPHIL NFR BLD AUTO: 0 %
ERYTHROCYTE [DISTWIDTH] IN BLOOD BY AUTOMATED COUNT: 13.7 % (ref 10–15)
ETHANOL SERPL-MCNC: 0.39 G/DL
FENTANYL UR QL: ABNORMAL
GLUCOSE SERPL-MCNC: 101 MG/DL (ref 70–99)
GLUCOSE UR STRIP-MCNC: NEGATIVE MG/DL
HCG SERPL QL: NEGATIVE
HCT VFR BLD AUTO: 41 % (ref 35–47)
HGB BLD-MCNC: 13.9 G/DL (ref 11.7–15.7)
HGB UR QL STRIP: NEGATIVE
IMM GRANULOCYTES # BLD: 0.1 10E3/UL
IMM GRANULOCYTES NFR BLD: 1 %
KETONES UR STRIP-MCNC: NEGATIVE MG/DL
LEUKOCYTE ESTERASE UR QL STRIP: NEGATIVE
LYMPHOCYTES # BLD AUTO: 1.9 10E3/UL (ref 0.8–5.3)
LYMPHOCYTES NFR BLD AUTO: 16 %
MAGNESIUM SERPL-MCNC: 2.6 MG/DL (ref 1.7–2.3)
MCH RBC QN AUTO: 32.1 PG (ref 26.5–33)
MCHC RBC AUTO-ENTMCNC: 33.9 G/DL (ref 31.5–36.5)
MCV RBC AUTO: 95 FL (ref 78–100)
MONOCYTES # BLD AUTO: 0.9 10E3/UL (ref 0–1.3)
MONOCYTES NFR BLD AUTO: 7 %
MUCOUS THREADS #/AREA URNS LPF: PRESENT /LPF
NEUTROPHILS # BLD AUTO: 9.3 10E3/UL (ref 1.6–8.3)
NEUTROPHILS NFR BLD AUTO: 75 %
NITRATE UR QL: NEGATIVE
NRBC # BLD AUTO: 0 10E3/UL
NRBC BLD AUTO-RTO: 0 /100
OPIATES UR QL SCN: ABNORMAL
PCP QUAL URINE (ROCHE): ABNORMAL
PH UR STRIP: 6 [PH] (ref 5–7)
PLATELET # BLD AUTO: 223 10E3/UL (ref 150–450)
POTASSIUM SERPL-SCNC: 3.8 MMOL/L (ref 3.4–5.3)
PROT SERPL-MCNC: 7.7 G/DL (ref 6.4–8.3)
RBC # BLD AUTO: 4.33 10E6/UL (ref 3.8–5.2)
RBC URINE: 1 /HPF
SODIUM SERPL-SCNC: 146 MMOL/L (ref 135–145)
SP GR UR STRIP: 1.01 (ref 1–1.03)
SQUAMOUS EPITHELIAL: 5 /HPF
UROBILINOGEN UR STRIP-MCNC: <2 MG/DL
WBC # BLD AUTO: 12.3 10E3/UL (ref 4–11)
WBC URINE: 2 /HPF

## 2023-10-21 PROCEDURE — 96360 HYDRATION IV INFUSION INIT: CPT

## 2023-10-21 PROCEDURE — 82077 ASSAY SPEC XCP UR&BREATH IA: CPT | Performed by: EMERGENCY MEDICINE

## 2023-10-21 PROCEDURE — 99285 EMERGENCY DEPT VISIT HI MDM: CPT | Mod: 25

## 2023-10-21 PROCEDURE — 84703 CHORIONIC GONADOTROPIN ASSAY: CPT | Performed by: EMERGENCY MEDICINE

## 2023-10-21 PROCEDURE — 85025 COMPLETE CBC W/AUTO DIFF WBC: CPT | Performed by: EMERGENCY MEDICINE

## 2023-10-21 PROCEDURE — 250N000009 HC RX 250: Performed by: EMERGENCY MEDICINE

## 2023-10-21 PROCEDURE — 250N000011 HC RX IP 250 OP 636: Performed by: EMERGENCY MEDICINE

## 2023-10-21 PROCEDURE — 80053 COMPREHEN METABOLIC PANEL: CPT | Performed by: EMERGENCY MEDICINE

## 2023-10-21 PROCEDURE — 81001 URINALYSIS AUTO W/SCOPE: CPT | Mod: XU | Performed by: EMERGENCY MEDICINE

## 2023-10-21 PROCEDURE — 70450 CT HEAD/BRAIN W/O DYE: CPT

## 2023-10-21 PROCEDURE — 36415 COLL VENOUS BLD VENIPUNCTURE: CPT | Performed by: EMERGENCY MEDICINE

## 2023-10-21 PROCEDURE — 96361 HYDRATE IV INFUSION ADD-ON: CPT

## 2023-10-21 PROCEDURE — 258N000003 HC RX IP 258 OP 636: Performed by: EMERGENCY MEDICINE

## 2023-10-21 PROCEDURE — 83735 ASSAY OF MAGNESIUM: CPT | Performed by: EMERGENCY MEDICINE

## 2023-10-21 PROCEDURE — 80307 DRUG TEST PRSMV CHEM ANLYZR: CPT | Performed by: EMERGENCY MEDICINE

## 2023-10-21 PROCEDURE — 73030 X-RAY EXAM OF SHOULDER: CPT | Mod: RT

## 2023-10-21 RX ORDER — ONDANSETRON 4 MG/1
4 TABLET, ORALLY DISINTEGRATING ORAL EVERY 8 HOURS PRN
Qty: 10 TABLET | Refills: 0 | Status: SHIPPED | OUTPATIENT
Start: 2023-10-21 | End: 2023-10-23

## 2023-10-21 RX ORDER — FAMOTIDINE 20 MG/1
20 TABLET, FILM COATED ORAL 2 TIMES DAILY
Qty: 20 TABLET | Refills: 0 | Status: SHIPPED | OUTPATIENT
Start: 2023-10-21 | End: 2023-10-23

## 2023-10-21 RX ORDER — CLONIDINE HYDROCHLORIDE 0.1 MG/1
0.1 TABLET ORAL 3 TIMES DAILY
Qty: 12 TABLET | Refills: 0 | Status: SHIPPED | OUTPATIENT
Start: 2023-10-21 | End: 2023-10-23

## 2023-10-21 RX ADMIN — SODIUM CHLORIDE 1000 ML: 9 INJECTION, SOLUTION INTRAVENOUS at 02:45

## 2023-10-21 RX ADMIN — FOLIC ACID: 5 INJECTION, SOLUTION INTRAMUSCULAR; INTRAVENOUS; SUBCUTANEOUS at 02:52

## 2023-10-21 ASSESSMENT — ACTIVITIES OF DAILY LIVING (ADL)
ADLS_ACUITY_SCORE: 35

## 2023-10-21 NOTE — ED NOTES
Pt up to restroom to void.  SBA x2, pt has unsteady gait.  Unable to move right arm up to assist in using restroom.  Unable to obtain UA during this bathroom visit as pt was needing to use the restroom quickly.
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
## 2023-10-21 NOTE — ED NOTES
Andrez, pts friend was present in room.  Pt wanting friend to stay with her.  Pts friend had to go to work, but is willing to come get her after he gets done with work at 0930.  Andrez Jones [phone]

## 2023-10-21 NOTE — ED NOTES
Patient returns from radiology.  Aware of approximate wait time for results.  Will continue to monitor.  Pt now complaining more of right arm pain and is unable to lift arm up above head.  Provider made aware.  Awaiting order.

## 2023-10-21 NOTE — DISCHARGE INSTRUCTIONS
Tylenol 650 mg every 4 hours as needed for pain  Ibuprofen 600 mg every 6 hours as needed for pain  Ice to your injuries for 10-15 minutes every 1-2 hours for 1-2 days  Abstain from alcohol
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
## 2023-10-23 ENCOUNTER — OFFICE VISIT (OUTPATIENT)
Dept: FAMILY MEDICINE | Facility: CLINIC | Age: 33
End: 2023-10-23
Attending: FAMILY MEDICINE
Payer: COMMERCIAL

## 2023-10-23 VITALS
RESPIRATION RATE: 12 BRPM | HEART RATE: 88 BPM | SYSTOLIC BLOOD PRESSURE: 126 MMHG | DIASTOLIC BLOOD PRESSURE: 78 MMHG | BODY MASS INDEX: 20 KG/M2 | WEIGHT: 132 LBS | OXYGEN SATURATION: 100 % | TEMPERATURE: 98.9 F | HEIGHT: 68 IN

## 2023-10-23 DIAGNOSIS — F90.2 ATTENTION DEFICIT HYPERACTIVITY DISORDER (ADHD), COMBINED TYPE: Primary | ICD-10-CM

## 2023-10-23 PROCEDURE — 99213 OFFICE O/P EST LOW 20 MIN: CPT | Performed by: FAMILY MEDICINE

## 2023-10-23 RX ORDER — LEVONORGESTREL/ETHIN.ESTRADIOL 0.1-0.02MG
1 TABLET ORAL DAILY
Status: CANCELLED | OUTPATIENT
Start: 2023-10-23

## 2023-10-23 RX ORDER — DEXTROAMPHETAMINE SACCHARATE, AMPHETAMINE ASPARTATE, DEXTROAMPHETAMINE SULFATE AND AMPHETAMINE SULFATE 2.5; 2.5; 2.5; 2.5 MG/1; MG/1; MG/1; MG/1
TABLET ORAL
Qty: 60 TABLET | Refills: 0 | Status: SHIPPED | OUTPATIENT
Start: 2023-11-22 | End: 2023-12-08

## 2023-10-23 RX ORDER — DEXTROAMPHETAMINE SACCHARATE, AMPHETAMINE ASPARTATE, DEXTROAMPHETAMINE SULFATE AND AMPHETAMINE SULFATE 2.5; 2.5; 2.5; 2.5 MG/1; MG/1; MG/1; MG/1
TABLET ORAL
Qty: 60 TABLET | Refills: 0 | Status: SHIPPED | OUTPATIENT
Start: 2023-10-23 | End: 2023-11-22

## 2023-10-23 RX ORDER — DEXTROAMPHETAMINE SACCHARATE, AMPHETAMINE ASPARTATE, DEXTROAMPHETAMINE SULFATE AND AMPHETAMINE SULFATE 2.5; 2.5; 2.5; 2.5 MG/1; MG/1; MG/1; MG/1
TABLET ORAL
Qty: 90 TABLET | Refills: 0 | Status: SHIPPED | OUTPATIENT
Start: 2023-12-22 | End: 2023-12-08

## 2023-10-23 NOTE — PROGRESS NOTES
Assessment & Plan   Attention deficit hyperactivity disorder (ADHD), combined type  - appointments every 6 months  Controlled - continue medication(s).  - amphetamine-dextroamphetamine (ADDERALL) 10 MG tablet  Dispense: 60 tablet; Refill: 0  - amphetamine-dextroamphetamine (ADDERALL) 10 MG tablet  Dispense: 60 tablet; Refill: 0  - amphetamine-dextroamphetamine (ADDERALL) 10 MG tablet  Dispense: 90 tablet; Refill: 0      Nicotine/Tobacco Cessation:  She reports that she has been smoking cigarettes. She has a 0.06 pack-year smoking history. She has never used smokeless tobacco.  Nicotine/Tobacco Cessation Plan:   Information offered: Patient not interested at this time            No follow-ups on file.   Follow-up Visit   Expected date:  Apr 23, 2024 (Approximate)      Follow Up Appointment Details:     Follow-up with whom?: Me    Follow-Up for what?: Chronic Disease f/u    Chronic Disease f/u: General (Other)    Additional Details: adhd    How?: In Person or Virtual    Is this an as-needed follow-up?: No                         Andrew Sales MD      [address] [address]  [employer]   Office: [phone]       Lazarus Buchanan is a 33 year old, presenting for the following health issues:  Recheck Medication and Refill Request    History of Present Illness       Reason for visit:  Medication check    She eats 4 or more servings of fruits and vegetables daily.She consumes 0 sweetened beverage(s) daily.She exercises with enough effort to increase her heart rate 30 to 60 minutes per day.  She exercises with enough effort to increase her heart rate 6 days per week.   She is taking medications regularly.     Medication Followup for ADHD recheck - Adderall  Taking Medication as prescribed: yes but ran out  Side Effects:  None  Medication Helping Symptoms:  yes      Review of Systems         Objective    /78 (BP Location: Right arm, Patient Position:  "Sitting, Cuff Size: Adult Regular)   Pulse 88   Temp 98.9  F (37.2  C) (Tympanic)   Resp 12   Ht 1.727 m (5' 8\")   Wt 59.9 kg (132 lb)   LMP 10/05/2023 (Approximate)   SpO2 100%   BMI 20.07 kg/m    Body mass index is 20.07 kg/m .  Physical Exam   GENERAL: healthy, alert and no distress  NECK: no adenopathy, no asymmetry, masses, or scars and thyroid normal to palpation  RESP: lungs clear to auscultation - no rales, rhonchi or wheezes  CV: regular rate and rhythm, normal S1 S2, no S3 or S4, no murmur, click or rub, no peripheral edema and peripheral pulses strong  ABDOMEN: soft, nontender, no hepatosplenomegaly, no masses and bowel sounds normal  MS: no gross musculoskeletal defects noted, no edema                    "

## 2023-10-23 NOTE — LETTER
[facility] [place]  10/23/23  Patient: Chanel Cotto  YOB: 1990  Medical Record Number: [id number]                                                                                  Non-Opioid Controlled Substance Agreement    This is an agreement between you and your provider regarding safe and appropriate use of controlled substances prescribed by your care team. Controlled substances are?medicines that can cause physical and mental dependence (abuse).     There are strict laws about having and using these medicines. We here at [facility] are  committed to working with you in your efforts to get better. To support you in this work, we'll help you schedule regular office appointments for medicine refills. If we must cancel or change your appointment for any reason, we'll make sure you have enough medicine to last until your next appointment.     As a Provider, I will:   Listen carefully to your concerns while treating you with respect.   Recommend a treatment plan that I believe is in your best interest and may involve therapies other than medicine.    Talk with you often about the possible benefits and the risk of harm of any medicine that we prescribe for you.  Assess the safety of this medicine and check how well it works.    Provide a plan on how to taper (discontinue or go off) using this medicine if the decision is made to stop its use.      ::  As a Patient, I understand controlled substances:     Are prescribed by my care provider to help me function or work and manage my condition(s).?  Are strong medicines and can cause serious side effects.     Need to be taken exactly as prescribed.?Combining controlled substances with certain medicines or chemicals (such as illegal drugs, alcohol, sedatives, sleeping pills, and benzodiazepines) can be dangerous or even fatal.? If I stop taking my medicines suddenly, I may have severe withdrawal symptoms.     The risks, benefits,  and side effects of these medicine(s) were explained to me. I agree that:    I will take part in other treatments as advised by my care team. This may be psychiatry or counseling, physical therapy, behavioral therapy, group treatment or a referral to specialist.    I will keep all my appointments and understand this is part of the monitoring of controlled substances.?My care team may require an office visit for EVERY controlled substance refill. If I miss appointments or don t follow instructions, my care team may stop my medicine    I will take my medicines as prescribed. I will not change the dose or schedule unless my care team tells me to. There will be no refills if I run out early.      I may be asked to come to the clinic and complete a urine drug test or complete a pill count. If I don t give a urine sample or participate in a pill count, the care team may stop my medicine.    I will only receive controlled substance prescriptions from this clinic. If I am treated by another provider, I will tell them that I am taking controlled substances and that I have a treatment agreement with this provider. I will inform my [facility] care team within one business day if I am given a prescription for any controlled substance by another healthcare provider. My [facility] care team can contact other providers and pharmacists about my use of any medicines.    It is up to me to make sure that I don't run out of my medicines on weekends or holidays.?If my care team is willing to refill my prescription without a visit, I must request refills only during office hours. Refills may take up to 3 business days to process. I will use one pharmacy to fill all my controlled substance prescriptions. I will notify the clinic about any changes to my insurance or medicine availability.    I am responsible for my prescriptions. If the medicine/prescription is lost, stolen or destroyed, it will not be replaced.?I also agree  not to share controlled substance medicines with anyone.     I am aware I should not use any illegal or recreational drugs. I agree not to drink alcohol unless my care team says I can.     If I enroll in the [place] Medical Cannabis program, I will tell my care team before my next refill.    I will tell my care team right away if I become pregnant, have a new medical problem treated outside of my regular clinic, or have a change in my medicines.     I understand that this medicine can affect my thinking, judgment and reaction time.? Alcohol and drugs affect the brain and body, which can affect the safety of my driving. Being under the influence of alcohol or drugs can affect my decision-making, behaviors, personal safety and the safety of others. Driving while impaired (DWI) can occur if a person is driving, operating or in physical control of a car, motorcycle, boat, snowmobile, ATV, motorbike, off-road vehicle or any other motor vehicle ([place] Statute 169A.20). I understand the risk if I choose to drive or operate any vehicle or machinery.    I understand that if I do not follow any of the conditions above, my prescriptions or treatment may be stopped or changed.   I agree that my provider, clinic care team and pharmacy may work with any city, state or federal law enforcement agency that investigates the misuse, sale or other diversion of my controlled medicine. I will allow my provider to discuss my care with, or share a copy of, this agreement with any other treating provider, pharmacy or emergency room where I receive care.     I have read this agreement and have asked questions about anything I did not understand.    ________________________________________________________  Patient Signature - hCanel Cotto     ___________________                   Date     ________________________________________________________  Provider Signature - Xu Sales MD       ___________________                   Date      ________________________________________________________  Witness Signature (required if provider not present while patient signing)          ___________________                   Date

## 2023-10-24 ENCOUNTER — PATIENT OUTREACH (OUTPATIENT)
Dept: CARE COORDINATION | Facility: CLINIC | Age: 33
End: 2023-10-24
Payer: COMMERCIAL

## 2023-10-24 NOTE — PROGRESS NOTES
Clinic Care Coordination Contact  Community Health Worker Initial Outreach    CHW Initial Information Gathering:  Referral Source: ED Follow-Up  CHW Additional Questions  If ED/Hospital discharge, follow-up appointment scheduled as recommended?: Other (Patient already had follow up with PCP yesterday.)  [place] active?: Yes    Patient accepts CC: No, patient declined at this time. Patient will be sent Care Coordination introduction letter for future reference.     Berenice Pardo  Community Health Worker  Connected Care Resource [facility]

## 2023-10-24 NOTE — LETTER
Chanel Cotto  [address] [address]    Dear Chanel Cotto,      I am a team member within the [facility] with [facility]. I recently contacted you to ensure you were doing well following a recent visit within our health system. I also wanted to take this chance to introduce Clinic Care Coordination should you have any interest in this program in the future.    Below is a description of Clinic Care Coordination and how this team can further assist you:       The Clinic Care Coordination team is made up of a Registered Nurse, , Financial Resource Worker, and a Community Health Worker who understand and can help navigate the health care system. The goal of clinic care coordination is to help you manage your health, improve access to care, and achieve optimal health outcomes. They work alongside your provider to assist you in determining your health and social needs, obtain health care and community resources, and provide you with necessary information and education. Clinic Care Coordination can work with you through any barriers and develop a care plan that helps coordinate and strengthen the relationship between you and your care team.    If you wish to connect with the Clinic Care Coordination Team, please let your [facility] Primary Care Provider or Clinic Care Team know and they can place a referral. The Clinic Care Coordination team will then reach out by phone to further support you.    We are focused on providing you with the highest-quality healthcare experience possible.    Sincerely,   Your care team with [facility]

## 2023-10-28 ENCOUNTER — TRANSFERRED RECORDS (OUTPATIENT)
Dept: HEALTH INFORMATION MANAGEMENT | Facility: CLINIC | Age: 33
End: 2023-10-28

## 2023-10-28 ENCOUNTER — OFFICE VISIT (OUTPATIENT)
Dept: FAMILY MEDICINE | Facility: CLINIC | Age: 33
End: 2023-10-28
Payer: COMMERCIAL

## 2023-10-28 VITALS
WEIGHT: 138 LBS | SYSTOLIC BLOOD PRESSURE: 121 MMHG | DIASTOLIC BLOOD PRESSURE: 77 MMHG | TEMPERATURE: 96.9 F | OXYGEN SATURATION: 98 % | HEART RATE: 76 BPM | BODY MASS INDEX: 20.98 KG/M2

## 2023-10-28 DIAGNOSIS — S49.91XS INJURY OF RIGHT UPPER ARM, SEQUELA: ICD-10-CM

## 2023-10-28 DIAGNOSIS — S49.91XS RIGHT SHOULDER INJURY, SEQUELA: Primary | ICD-10-CM

## 2023-10-28 PROCEDURE — 99213 OFFICE O/P EST LOW 20 MIN: CPT | Performed by: FAMILY MEDICINE

## 2023-10-28 ASSESSMENT — PAIN SCALES - GENERAL: PAINLEVEL: WORST PAIN (10)

## 2023-10-28 NOTE — PATIENT INSTRUCTIONS
To Ortho urgent care clinic     [facility]  Ortho urgent care clinic  [address], [place] [address]
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
## 2023-11-02 ENCOUNTER — TRANSFERRED RECORDS (OUTPATIENT)
Dept: HEALTH INFORMATION MANAGEMENT | Facility: CLINIC | Age: 33
End: 2023-11-02
Payer: COMMERCIAL

## 2023-11-14 ENCOUNTER — TRANSFERRED RECORDS (OUTPATIENT)
Dept: HEALTH INFORMATION MANAGEMENT | Facility: CLINIC | Age: 33
End: 2023-11-14
Payer: COMMERCIAL

## 2023-12-05 ENCOUNTER — TRANSFERRED RECORDS (OUTPATIENT)
Dept: HEALTH INFORMATION MANAGEMENT | Facility: CLINIC | Age: 33
End: 2023-12-05
Payer: COMMERCIAL

## 2023-12-08 ENCOUNTER — TELEPHONE (OUTPATIENT)
Dept: FAMILY MEDICINE | Facility: CLINIC | Age: 33
End: 2023-12-08
Payer: COMMERCIAL

## 2023-12-08 DIAGNOSIS — F90.2 ATTENTION DEFICIT HYPERACTIVITY DISORDER (ADHD), COMBINED TYPE: ICD-10-CM

## 2023-12-08 RX ORDER — DEXTROAMPHETAMINE SACCHARATE, AMPHETAMINE ASPARTATE, DEXTROAMPHETAMINE SULFATE AND AMPHETAMINE SULFATE 2.5; 2.5; 2.5; 2.5 MG/1; MG/1; MG/1; MG/1
TABLET ORAL
Qty: 90 TABLET | Refills: 0 | Status: SHIPPED | OUTPATIENT
Start: 2023-12-22 | End: 2024-03-12

## 2023-12-08 RX ORDER — DEXTROAMPHETAMINE SACCHARATE, AMPHETAMINE ASPARTATE, DEXTROAMPHETAMINE SULFATE AND AMPHETAMINE SULFATE 2.5; 2.5; 2.5; 2.5 MG/1; MG/1; MG/1; MG/1
TABLET ORAL
Qty: 60 TABLET | Refills: 0 | Status: SHIPPED | OUTPATIENT
Start: 2023-12-08 | End: 2024-03-12

## 2023-12-08 NOTE — TELEPHONE ENCOUNTER
Patient's 11/22/23 Adderall rx needs to go to [facility] in [place], due to [facility] not having any in stock. Please send 11/22/23 Adderall rx. Routing to providers currently in clinic.

## 2023-12-08 NOTE — TELEPHONE ENCOUNTER
Left message that rx was sent to correct pharmacy. Closing encounter. [employer] message has not been read as of yet.

## 2023-12-08 NOTE — TELEPHONE ENCOUNTER
Patient is calling to report the [facility] in [place] has been out of stock of her 11/22/23 Adderall 10mg rx for awhile. Patient reports that she called the [facility] in [place] off [address] and they do have Adderall 10mg in stock. Please send rx to pharmacy desired attached.     Patient would like a callback to be notified once rx has been sent

## 2024-03-05 ENCOUNTER — TRANSFERRED RECORDS (OUTPATIENT)
Dept: HEALTH INFORMATION MANAGEMENT | Facility: CLINIC | Age: 34
End: 2024-03-05
Payer: COMMERCIAL

## 2024-03-12 ENCOUNTER — VIRTUAL VISIT (OUTPATIENT)
Dept: FAMILY MEDICINE | Facility: CLINIC | Age: 34
End: 2024-03-12
Payer: COMMERCIAL

## 2024-03-12 ENCOUNTER — OFFICE VISIT (OUTPATIENT)
Dept: PLASTIC SURGERY | Facility: CLINIC | Age: 34
End: 2024-03-12
Payer: COMMERCIAL

## 2024-03-12 DIAGNOSIS — F90.2 ATTENTION DEFICIT HYPERACTIVITY DISORDER (ADHD), COMBINED TYPE: ICD-10-CM

## 2024-03-12 DIAGNOSIS — S02.2XXD CLOSED FRACTURE OF NASAL BONE WITH ROUTINE HEALING, SUBSEQUENT ENCOUNTER: Primary | ICD-10-CM

## 2024-03-12 PROCEDURE — 99213 OFFICE O/P EST LOW 20 MIN: CPT | Mod: 93 | Performed by: FAMILY MEDICINE

## 2024-03-12 RX ORDER — DEXTROAMPHETAMINE SACCHARATE, AMPHETAMINE ASPARTATE, DEXTROAMPHETAMINE SULFATE AND AMPHETAMINE SULFATE 2.5; 2.5; 2.5; 2.5 MG/1; MG/1; MG/1; MG/1
TABLET ORAL
Qty: 60 TABLET | Refills: 0 | Status: SHIPPED | OUTPATIENT
Start: 2024-04-11 | End: 2024-09-24

## 2024-03-12 RX ORDER — DEXTROAMPHETAMINE SACCHARATE, AMPHETAMINE ASPARTATE, DEXTROAMPHETAMINE SULFATE AND AMPHETAMINE SULFATE 2.5; 2.5; 2.5; 2.5 MG/1; MG/1; MG/1; MG/1
TABLET ORAL
Qty: 60 TABLET | Refills: 0 | Status: SHIPPED | OUTPATIENT
Start: 2024-05-11 | End: 2024-09-24

## 2024-03-12 RX ORDER — DEXTROAMPHETAMINE SACCHARATE, AMPHETAMINE ASPARTATE, DEXTROAMPHETAMINE SULFATE AND AMPHETAMINE SULFATE 2.5; 2.5; 2.5; 2.5 MG/1; MG/1; MG/1; MG/1
TABLET ORAL
Qty: 60 TABLET | Refills: 0 | Status: SHIPPED | OUTPATIENT
Start: 2024-03-12 | End: 2024-09-24
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
## 2024-03-12 NOTE — PROGRESS NOTES
Chanel is a 33 year old who is being evaluated via a billable video visit.      How would you like to obtain your AVS? MyChart  If the video visit is dropped, the invitation should be resent by: Text to cell phone: [phone]  Will anyone else be joining your video visit? No      Assessment & Plan   Attention deficit hyperactivity disorder (ADHD), combined type  - appointments every 6 months  Overall doing well, does not take some days when she is busy at work.  No side effects to complain about and will continue current dosing.  - amphetamine-dextroamphetamine (ADDERALL) 10 MG tablet  Dispense: 60 tablet; Refill: 0  Recheck in 6 months virtual or in person.  No follow-ups on file.       Andrew Sales MD      [address] [address]  [email or web address]   Office: [phone]         Subjective   Chanel is a 33 year old, presenting for the following health issues:  Recheck Medication    HPI     Medication Followup for ADHD:  amphetamine-dextroamphetamine (ADDERALL) 10 MG tablet   Taking Medication as prescribed: yes  Side Effects:  None  Medication Helping Symptoms:  yes        Objective       Vitals:  No vitals were obtained today due to virtual visit.    Physical Exam   healthy, alert, and no distress  PSYCH: Alert and oriented times 3; coherent speech, normal   rate and volume, able to articulate logical thoughts, able   to abstract reason, no tangential thoughts, no hallucinations   or delusions  Her affect is normal  RESP: No cough, no audible wheezing, able to talk in full sentences  Remainder of exam unable to be completed due to telephone visits          Telephone visit - 6 minutes  Signed Electronically by: Xu Sales MD

## 2024-06-23 ENCOUNTER — HEALTH MAINTENANCE LETTER (OUTPATIENT)
Age: 34
End: 2024-06-23

## 2024-09-24 ENCOUNTER — VIRTUAL VISIT (OUTPATIENT)
Dept: FAMILY MEDICINE | Facility: CLINIC | Age: 34
End: 2024-09-24
Payer: COMMERCIAL

## 2024-09-24 DIAGNOSIS — F90.2 ATTENTION DEFICIT HYPERACTIVITY DISORDER (ADHD), COMBINED TYPE: ICD-10-CM

## 2024-09-24 PROCEDURE — G2211 COMPLEX E/M VISIT ADD ON: HCPCS | Mod: 95 | Performed by: FAMILY MEDICINE

## 2024-09-24 PROCEDURE — 99213 OFFICE O/P EST LOW 20 MIN: CPT | Mod: 95 | Performed by: FAMILY MEDICINE

## 2024-09-24 RX ORDER — DEXTROAMPHETAMINE SACCHARATE, AMPHETAMINE ASPARTATE, DEXTROAMPHETAMINE SULFATE AND AMPHETAMINE SULFATE 2.5; 2.5; 2.5; 2.5 MG/1; MG/1; MG/1; MG/1
TABLET ORAL
Qty: 60 TABLET | Refills: 0 | Status: SHIPPED | OUTPATIENT
Start: 2024-11-23

## 2024-09-24 RX ORDER — DEXTROAMPHETAMINE SACCHARATE, AMPHETAMINE ASPARTATE, DEXTROAMPHETAMINE SULFATE AND AMPHETAMINE SULFATE 2.5; 2.5; 2.5; 2.5 MG/1; MG/1; MG/1; MG/1
TABLET ORAL
Qty: 60 TABLET | Refills: 0 | Status: SHIPPED | OUTPATIENT
Start: 2024-09-24 | End: 2024-10-24

## 2024-09-24 RX ORDER — DEXTROAMPHETAMINE SACCHARATE, AMPHETAMINE ASPARTATE, DEXTROAMPHETAMINE SULFATE AND AMPHETAMINE SULFATE 2.5; 2.5; 2.5; 2.5 MG/1; MG/1; MG/1; MG/1
TABLET ORAL
Qty: 60 TABLET | Refills: 0 | Status: SHIPPED | OUTPATIENT
Start: 2024-10-24 | End: 2024-11-23

## 2024-09-24 NOTE — PROGRESS NOTES
Chanel is a 34 year old who is being evaluated via a billable video visit.    How would you like to obtain your AVS? MyChart  If the video visit is dropped, the invitation should be resent by: Text to cell phone: [phone]  Will anyone else be joining your video visit? No      Assessment & Plan   Attention deficit hyperactivity disorder (ADHD), combined type  - appointments every 6 months  Controlled - continue medication.   - amphetamine-dextroamphetamine (ADDERALL) 10 MG tablet  Dispense: 60 tablet; Refill: 0  - amphetamine-dextroamphetamine (ADDERALL) 10 MG tablet  Dispense: 60 tablet; Refill: 0  - amphetamine-dextroamphetamine (ADDERALL) 10 MG tablet  Dispense: 60 tablet; Refill: 0        Nicotine/Tobacco Cessation  She reports that she has been smoking cigarettes. She has a 0.1 pack-year smoking history. She has never used smokeless tobacco.  Nicotine/Tobacco Cessation Plan  Self help information given to patient        No follow-ups on file.   Follow-up Visit   Expected date:  Mar 18, 2025 (Approximate)      Follow Up Appointment Details:     Follow-up with whom?: Me    Follow-Up for what?: Other (Office Visit)    Additional Details: ADHD    How?: In Person or Virtual                   The longitudinal plan of care for the diagnosis(es)/condition(s) as documented were addressed during this visit. Due to the added complexity in care, I will continue to support Chanel in the subsequent management and with ongoing continuity of care.          Andrew Sales MD      [address] [address]  [email or web address]   Office: [phone]         Subjective   Chanel is a 34 year old, presenting for the following health issues:  Video Visit    History of Present Illness    Chanel Cotto, 34 years old.    Attention deficit hyperactivity disorder (ADHD), combined type  She had issues with her insurance over the summer which led to a delay in getting her ADHD  medication. She had a prescription but the cost of the medication without insurance was around $200. She has no side effects from the medication and no trouble sleeping.        Via the Health Maintenance questionnaire, the patient has reported the following services have been completed -Cervical Cancer Screening: [facility] 2024-09-24, this information has been sent to the abstraction team.  Video Start Time: 11:07 AM    History of Present Illness       Reason for visit:  Medication refill   She is taking medications regularly.       Medication Followup of Adderall  Taking Medication as prescribed: NO-Out since April due to insurance issues   Side Effects:  None  Medication Helping Symptoms:  not currently taking medication             Objective           Vitals:  No vitals were obtained today due to virtual visit.    Physical Exam   GENERAL: alert and no distress  EYES: Eyes grossly normal to inspection.  No discharge or erythema, or obvious scleral/conjunctival abnormalities.  RESP: No audible wheeze, cough, or visible cyanosis.    SKIN: Visible skin clear. No significant rash, abnormal pigmentation or lesions.  NEURO: Cranial nerves grossly intact.  Mentation and speech appropriate for age.  PSYCH: Appropriate affect, tone, and pace of words        Video-Visit Details    Type of service:  Video Visit   Video End Time:11:13 AM  Originating Location (pt. Location): Home    Distant Location (provider location):  On-site  Platform used for Video Visit: Foreign  Signed Electronically by: Xu Sales MD

## 2024-09-26 ENCOUNTER — OFFICE VISIT (OUTPATIENT)
Dept: FAMILY MEDICINE | Facility: CLINIC | Age: 34
End: 2024-09-26
Payer: COMMERCIAL

## 2024-09-26 ENCOUNTER — TRANSFERRED RECORDS (OUTPATIENT)
Dept: MULTI SPECIALTY CLINIC | Facility: CLINIC | Age: 34
End: 2024-09-26

## 2024-09-26 VITALS
RESPIRATION RATE: 20 BRPM | DIASTOLIC BLOOD PRESSURE: 71 MMHG | HEART RATE: 64 BPM | OXYGEN SATURATION: 99 % | SYSTOLIC BLOOD PRESSURE: 111 MMHG | TEMPERATURE: 98.3 F

## 2024-09-26 DIAGNOSIS — L20.9 ATOPIC DERMATITIS, UNSPECIFIED TYPE: Primary | ICD-10-CM

## 2024-09-26 LAB — PAP SMEAR - HIM PATIENT REPORTED: NORMAL

## 2024-09-26 PROCEDURE — 99213 OFFICE O/P EST LOW 20 MIN: CPT | Performed by: FAMILY MEDICINE

## 2024-09-26 RX ORDER — TRIAMCINOLONE ACETONIDE 0.25 MG/G
OINTMENT TOPICAL
Qty: 15 G | Refills: 1 | Status: SHIPPED | OUTPATIENT
Start: 2024-09-26

## 2024-09-26 NOTE — PROGRESS NOTES
{PROVIDER CHARTING PREFERENCE:966628}    Lazarus Buchanan is a 34 year old, presenting for the following health issues:  Derm Problem (Mole on back and around eyes.)        9/26/2024    12:36 PM   Additional Questions   Roomed by Maulik WHITESIDE   Accompanied by self         9/26/2024   Declines Weight   Did patient decline having their weight taken? Yes        Via the Health Maintenance questionnaire, the patient has reported the following services have been completed -[facility], this information has been sent to the abstraction team.  History of Present Illness       Reason for visit:  Medication refill   She is taking medications regularly.       {MA/LPN/RN Pre-Provider Visit Orders- hCG/UA/Strep (Optional):354390}  {SUPERLIST (Optional):922424}  {additonal problems for provider to add (Optional):339723}    {ROS Picklists (Optional):407634}      Objective    /71 (BP Location: Right arm, Patient Position: Sitting, Cuff Size: Adult Regular)   Pulse 64   Temp 98.3  F (36.8  C) (Oral)   Resp 20   SpO2 99%   There is no height or weight on file to calculate BMI.  Physical Exam   {Exam List (Optional):121153}    {Diagnostic Test Results (Optional):726322}        Signed Electronically by: Joanne Brody MD  {Email feedback regarding this note to [email or web address]   :189267}    Prior to immunization administration, verified patients identity using patient s name and date of birth. Please see Immunization Activity for additional information.     Screening Questionnaire for Adult Immunization    Are you sick today?   No   Do you have allergies to medications, food, a vaccine component or latex?   Yes   Have you ever had a serious reaction after receiving a vaccination?   No   Do you have a long-term health problem with heart, lung, kidney, or metabolic disease (e.g., diabetes), asthma, a blood disorder, no spleen, complement component  deficiency, a cochlear implant, or a spinal fluid leak?  Are you on long-term aspirin therapy?   No   Do you have cancer, leukemia, HIV/AIDS, or any other immune system problem?   No   Do you have a parent, brother, or sister with an immune system problem?   No   In the past 3 months, have you taken medications that affect  your immune system, such as prednisone, other steroids, or anticancer drugs; drugs for the treatment of rheumatoid arthritis, Crohn s disease, or psoriasis; or have you had radiation treatments?   No   Have you had a seizure, or a brain or other nervous system problem?   No   During the past year, have you received a transfusion of blood or blood    products, or been given immune (gamma) globulin or antiviral drug?   No   For women: Are you pregnant or is there a chance you could become       pregnant during the next month?   No   Have you received any vaccinations in the past 4 weeks?   No     Immunization questionnaire was positive for at least one answer.  Notified provider.      Patient instructed to remain in clinic for 15 minutes afterwards, and to report any adverse reactions.     Screening performed by Maulik Zambrano MA on 9/26/2024 at 12:39 PM.         diabetes), asthma, a blood disorder, no spleen, complement component deficiency, a cochlear implant, or a spinal fluid leak?  Are you on long-term aspirin therapy?   No   Do you have cancer, leukemia, HIV/AIDS, or any other immune system problem?   No   Do you have a parent, brother, or sister with an immune system problem?   No   In the past 3 months, have you taken medications that affect  your immune system, such as prednisone, other steroids, or anticancer drugs; drugs for the treatment of rheumatoid arthritis, Crohn s disease, or psoriasis; or have you had radiation treatments?   No   Have you had a seizure, or a brain or other nervous system problem?   No   During the past year, have you received a transfusion of blood or blood    products, or been given immune (gamma) globulin or antiviral drug?   No   For women: Are you pregnant or is there a chance you could become       pregnant during the next month?   No   Have you received any vaccinations in the past 4 weeks?   No     Immunization questionnaire was positive for at least one answer.  Notified provider.      Patient instructed to remain in clinic for 15 minutes afterwards, and to report any adverse reactions.     Screening performed by Maulik Zambrano MA on 9/26/2024 at 12:39 PM.

## 2024-12-14 ENCOUNTER — HOSPITAL ENCOUNTER (EMERGENCY)
Facility: CLINIC | Age: 34
Discharge: HOME | End: 2024-12-14
Payer: COMMERCIAL

## 2024-12-14 VITALS
RESPIRATION RATE: 18 BRPM | SYSTOLIC BLOOD PRESSURE: 139 MMHG | DIASTOLIC BLOOD PRESSURE: 99 MMHG | TEMPERATURE: 99.2 F | BODY MASS INDEX: 21.19 KG/M2 | OXYGEN SATURATION: 100 % | HEIGHT: 67 IN | WEIGHT: 135 LBS | HEART RATE: 84 BPM

## 2024-12-14 DIAGNOSIS — S01.81XA CHIN LACERATION, INITIAL ENCOUNTER: ICD-10-CM

## 2024-12-14 PROCEDURE — 250N000009 HC RX 250: Performed by: EMERGENCY MEDICINE

## 2024-12-14 PROCEDURE — 12013 RPR F/E/E/N/L/M 2.6-5.0 CM: CPT

## 2024-12-14 PROCEDURE — 271N000002 HC RX 271: Performed by: EMERGENCY MEDICINE

## 2024-12-14 PROCEDURE — 250N000011 HC RX IP 250 OP 636

## 2024-12-14 PROCEDURE — 250N000009 HC RX 250

## 2024-12-14 PROCEDURE — 90471 IMMUNIZATION ADMIN: CPT

## 2024-12-14 PROCEDURE — 99283 EMERGENCY DEPT VISIT LOW MDM: CPT | Mod: 25

## 2024-12-14 PROCEDURE — 90715 TDAP VACCINE 7 YRS/> IM: CPT

## 2024-12-14 RX ORDER — GINSENG 100 MG
CAPSULE ORAL ONCE
Status: COMPLETED | OUTPATIENT
Start: 2024-12-14 | End: 2024-12-14

## 2024-12-14 RX ORDER — METHYLCELLULOSE 4000CPS 30 %
POWDER (GRAM) MISCELLANEOUS ONCE
Status: COMPLETED | OUTPATIENT
Start: 2024-12-14 | End: 2024-12-14

## 2024-12-14 RX ADMIN — BACITRACIN: 500 OINTMENT TOPICAL at 23:05

## 2024-12-14 RX ADMIN — Medication: at 21:55

## 2024-12-14 RX ADMIN — CLOSTRIDIUM TETANI TOXOID ANTIGEN (FORMALDEHYDE INACTIVATED), CORYNEBACTERIUM DIPHTHERIAE TOXOID ANTIGEN (FORMALDEHYDE INACTIVATED), BORDETELLA PERTUSSIS TOXOID ANTIGEN (GLUTARALDEHYDE INACTIVATED), BORDETELLA PERTUSSIS FILAMENTOUS HEMAGGLUTININ ANTIGEN (FORMALDEHYDE INACTIVATED), BORDETELLA PERTUSSIS PERTACTIN ANTIGEN, AND BORDETELLA PERTUSSIS FIMBRIAE 2/3 ANTIGEN 0.5 ML: 5; 2; 2.5; 5; 3; 5 INJECTION, SUSPENSION INTRAMUSCULAR at 22:47

## 2024-12-14 RX ADMIN — Medication 3 ML: at 21:55

## 2024-12-14 ASSESSMENT — COLUMBIA-SUICIDE SEVERITY RATING SCALE - C-SSRS
6. HAVE YOU EVER DONE ANYTHING, STARTED TO DO ANYTHING, OR PREPARED TO DO ANYTHING TO END YOUR LIFE?: NO
1. IN THE PAST MONTH, HAVE YOU WISHED YOU WERE DEAD OR WISHED YOU COULD GO TO SLEEP AND NOT WAKE UP?: NO
2. HAVE YOU ACTUALLY HAD ANY THOUGHTS OF KILLING YOURSELF IN THE PAST MONTH?: NO

## 2024-12-14 ASSESSMENT — ACTIVITIES OF DAILY LIVING (ADL)
ADLS_ACUITY_SCORE: 41
ADLS_ACUITY_SCORE: 41

## 2024-12-15 ENCOUNTER — HOSPITAL ENCOUNTER (EMERGENCY)
Facility: CLINIC | Age: 34
Discharge: HOME | End: 2024-12-15
Admitting: EMERGENCY MEDICINE
Payer: COMMERCIAL

## 2024-12-15 VITALS
BODY MASS INDEX: 21.19 KG/M2 | HEART RATE: 79 BPM | WEIGHT: 135 LBS | HEIGHT: 67 IN | OXYGEN SATURATION: 99 % | RESPIRATION RATE: 18 BRPM | SYSTOLIC BLOOD PRESSURE: 133 MMHG | TEMPERATURE: 99.4 F | DIASTOLIC BLOOD PRESSURE: 83 MMHG

## 2024-12-15 DIAGNOSIS — Z51.89 VISIT FOR WOUND CHECK: ICD-10-CM

## 2024-12-15 PROCEDURE — 99283 EMERGENCY DEPT VISIT LOW MDM: CPT

## 2024-12-15 RX ORDER — ACETAMINOPHEN 500 MG
500-1000 TABLET ORAL EVERY 6 HOURS PRN
Qty: 60 TABLET | Refills: 0 | Status: SHIPPED | OUTPATIENT
Start: 2024-12-15

## 2024-12-15 RX ORDER — BACITRACIN ZINC AND POLYMYXIN B SULFATE 500; 1000 [USP'U]/G; [USP'U]/G
OINTMENT TOPICAL 2 TIMES DAILY
Qty: 30 G | Refills: 0 | Status: SHIPPED | OUTPATIENT
Start: 2024-12-15

## 2024-12-15 RX ORDER — IBUPROFEN 200 MG
600 TABLET ORAL EVERY 4 HOURS PRN
Qty: 60 TABLET | Refills: 0 | Status: SHIPPED | OUTPATIENT
Start: 2024-12-15

## 2024-12-15 ASSESSMENT — COLUMBIA-SUICIDE SEVERITY RATING SCALE - C-SSRS
1. IN THE PAST MONTH, HAVE YOU WISHED YOU WERE DEAD OR WISHED YOU COULD GO TO SLEEP AND NOT WAKE UP?: NO
2. HAVE YOU ACTUALLY HAD ANY THOUGHTS OF KILLING YOURSELF IN THE PAST MONTH?: NO
6. HAVE YOU EVER DONE ANYTHING, STARTED TO DO ANYTHING, OR PREPARED TO DO ANYTHING TO END YOUR LIFE?: NO

## 2024-12-15 NOTE — ED TRIAGE NOTES
Pt presents for evaluation of laceration repair. Pt reports leakage at site. Pt was seen here last night for laceration repair. ABCs intact. GCS 15.      Triage Assessment (Adult)       Row Name 12/15/24 1700          Triage Assessment    Airway WDL WDL        Respiratory WDL    Respiratory WDL WDL        Skin Circulation/Temperature WDL    Skin Circulation/Temperature WDL WDL        Cardiac WDL    Cardiac WDL WDL        Peripheral/Neurovascular WDL    Peripheral Neurovascular WDL WDL        Cognitive/Neuro/Behavioral WDL    Cognitive/Neuro/Behavioral WDL WDL
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
## 2024-12-15 NOTE — ED TRIAGE NOTES
Pt presents to ED with reports of slipping on some ice outside of her apartment tonight causing a chin laceration.  Denies LOC or dizziness.  Pt denies any other injuries.      Triage Assessment (Adult)       Row Name 12/14/24 5636          Triage Assessment    Airway WDL WDL        Respiratory WDL    Respiratory WDL WDL        Skin Circulation/Temperature WDL    Skin Circulation/Temperature WDL WDL        Cardiac WDL    Cardiac WDL WDL        Peripheral/Neurovascular WDL    Peripheral Neurovascular WDL WDL        Cognitive/Neuro/Behavioral WDL    Cognitive/Neuro/Behavioral WDL WDL

## 2024-12-15 NOTE — DISCHARGE INSTRUCTIONS
Please have your sutures removed in 5 days from your face.     Return to the ER if you have any pus or drainage from the wound or fevers.     We did not do any imaging of your jaw today as I have low suspicion for any facial fracture based on your exam and pain. If you have any inability to open and close your mouth return to the ER for reevaluation.

## 2024-12-15 NOTE — DISCHARGE INSTRUCTIONS
Your wound looks good, no sign of infection.  Sent prescriptions for ibuprofen, Tylenol, and Polysporin ointment for your wound, place a light coating over the area prior to covering it.  You should schedule an appointment with your primary care provider for 12/19 for suture removal follow-up or return to the emergency room for suture removal.    Pain Management:   You may take Tylenol 1000mg every 6 hours as needed, do not take more than 4000mg (4g) in 24 hours.  You may take ibuprofen 600mg every 8 hours as needed.     If you develop pus or thick drainage, fever and chills, increased redness or swelling, or any new symptoms, please return to the Emergency Department for evaluation.
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
## 2024-12-20 ENCOUNTER — HOSPITAL ENCOUNTER (EMERGENCY)
Facility: CLINIC | Age: 34
Discharge: HOME | End: 2024-12-21
Attending: EMERGENCY MEDICINE | Admitting: EMERGENCY MEDICINE
Payer: COMMERCIAL

## 2024-12-20 VITALS
SYSTOLIC BLOOD PRESSURE: 119 MMHG | HEIGHT: 68 IN | HEART RATE: 68 BPM | OXYGEN SATURATION: 100 % | RESPIRATION RATE: 18 BRPM | TEMPERATURE: 99.5 F | BODY MASS INDEX: 20.46 KG/M2 | WEIGHT: 135 LBS | DIASTOLIC BLOOD PRESSURE: 74 MMHG

## 2024-12-20 DIAGNOSIS — S01.81XD CHIN LACERATION, SUBSEQUENT ENCOUNTER: ICD-10-CM

## 2024-12-20 PROCEDURE — 99282 EMERGENCY DEPT VISIT SF MDM: CPT

## 2024-12-21 NOTE — DISCHARGE INSTRUCTIONS
Keep area clean and dry.  Steritapes will fall off in the next week or can be removed in 1 week.  See your doctor if any problems or signs of infection.  Tylenol or ibuprofen if needed.
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
## 2024-12-21 NOTE — ED TRIAGE NOTES
PT had stitches placed to her chin on 12/14 and is here to have them removed.      Triage Assessment (Adult)       Row Name 12/20/24 8983          Triage Assessment    Airway WDL WDL        Respiratory WDL    Respiratory WDL WDL        Skin Circulation/Temperature WDL    Skin Circulation/Temperature WDL WDL        Cardiac WDL    Cardiac WDL WDL        Peripheral/Neurovascular WDL    Peripheral Neurovascular WDL WDL

## 2024-12-26 ENCOUNTER — PATIENT OUTREACH (OUTPATIENT)
Dept: CARE COORDINATION | Facility: CLINIC | Age: 34
End: 2024-12-26
Payer: COMMERCIAL

## 2024-12-26 NOTE — PROGRESS NOTES
[facility] Care Resource Center Contact  [facility]/Voicemail     Clinical Data: Care Coordination ED-sourced Outreach-     Outreach attempted x 2.  Left message on patient's voicemail, providing [facility]'s 24/7 scheduling and nurse triage phone number [phone] ([phone]) for questions/concerns and/or to schedule an appt with an [facility] provider.      Care Coordination introduction letter with explanation of Clinic Care Coordination services sent to patient via [employer]. Clinic Care Coordination services remain available via referral if needed.    Plan: [facility] will do no further outreaches at this time.       EAMON Tim  Connected Care Resource [place], [facility]    *Connected Care Resource Team does NOT follow patient ongoing. Referrals are identified based on internal discharge reports and the outreach is to ensure patient has an understanding of their discharge instructions.

## 2024-12-26 NOTE — LETTER
Chanel Cotto  [address] [address]    Dear Chanel Cotto,      I am a team member within the Connected Care Resource Center with [facility]. I recently tried to reach you to ensure you were doing well following a recent visit within our health system. I also wanted to take this chance to introduce Clinic Care Coordination.     Below is a description of Clinic Care Coordination and how this team can further assist you:       The Clinic Care Coordination team is made up of a Registered Nurse, , Financial Resource Worker, and a Community Health Worker who understand and can help navigate the health care system. The goal of clinic care coordination is to help you manage your health, improve access to care, and achieve optimal health outcomes. They work alongside your provider to assist you in determining your health and social needs, obtain health care and community resources, and provide you with necessary information and education. Clinic Care Coordination can work with you through any barriers and develop a care plan that helps coordinate and strengthen the relationship between you and your care team.    If you wish to connect with the Clinic Care Coordination Team, please let your [facility] Primary Care Provider or Clinic Care Team know and they can place a referral. The Clinic Care Coordination team will then reach out by phone to further support you.    We are focused on providing you with the highest-quality healthcare experience possible.    Sincerely,   Your care team with [facility]'s [address] [place] [address] ([phone]).

## 2025-02-08 ENCOUNTER — APPOINTMENT (OUTPATIENT)
Dept: RADIOLOGY | Facility: CLINIC | Age: 35
End: 2025-02-08
Attending: EMERGENCY MEDICINE
Payer: COMMERCIAL

## 2025-02-08 ENCOUNTER — HOSPITAL ENCOUNTER (EMERGENCY)
Facility: CLINIC | Age: 35
Discharge: HOME | End: 2025-02-08
Admitting: EMERGENCY MEDICINE
Payer: COMMERCIAL

## 2025-02-08 VITALS
SYSTOLIC BLOOD PRESSURE: 131 MMHG | DIASTOLIC BLOOD PRESSURE: 84 MMHG | HEART RATE: 70 BPM | OXYGEN SATURATION: 98 % | BODY MASS INDEX: 19.7 KG/M2 | WEIGHT: 130 LBS | RESPIRATION RATE: 18 BRPM | TEMPERATURE: 98.6 F | HEIGHT: 68 IN

## 2025-02-08 DIAGNOSIS — S60.512A ABRASION OF LEFT HAND, INITIAL ENCOUNTER: ICD-10-CM

## 2025-02-08 PROCEDURE — 99283 EMERGENCY DEPT VISIT LOW MDM: CPT | Mod: 25 | Performed by: EMERGENCY MEDICINE

## 2025-02-08 PROCEDURE — 73130 X-RAY EXAM OF HAND: CPT | Mod: LT

## 2025-02-08 PROCEDURE — 12001 RPR S/N/AX/GEN/TRNK 2.5CM/<: CPT | Mod: F2,F3 | Performed by: EMERGENCY MEDICINE

## 2025-02-08 ASSESSMENT — COLUMBIA-SUICIDE SEVERITY RATING SCALE - C-SSRS
2. HAVE YOU ACTUALLY HAD ANY THOUGHTS OF KILLING YOURSELF IN THE PAST MONTH?: NO
1. IN THE PAST MONTH, HAVE YOU WISHED YOU WERE DEAD OR WISHED YOU COULD GO TO SLEEP AND NOT WAKE UP?: NO
6. HAVE YOU EVER DONE ANYTHING, STARTED TO DO ANYTHING, OR PREPARED TO DO ANYTHING TO END YOUR LIFE?: NO

## 2025-02-08 ASSESSMENT — ACTIVITIES OF DAILY LIVING (ADL): ADLS_ACUITY_SCORE: 41

## 2025-02-08 NOTE — ED TRIAGE NOTES
Pt fell PTA and landed on her left hand. She has some abrasion to her knuckles. Bleeding controlled. Concerned about foreign body in the wound     Triage Assessment (Adult)       Row Name 02/08/25 1019          Triage Assessment    Airway WDL WDL        Respiratory WDL    Respiratory WDL WDL        Skin Circulation/Temperature WDL    Skin Circulation/Temperature WDL WDL        Cardiac WDL    Cardiac WDL WDL        Peripheral/Neurovascular WDL    Peripheral Neurovascular WDL WDL        Cognitive/Neuro/Behavioral WDL    Cognitive/Neuro/Behavioral WDL WDL
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
## 2025-02-08 NOTE — Clinical Note
Chanel Cotto was seen and treated in our emergency department on 2/8/2025.  She may return to work on 02/10/2025.       If you have any questions or concerns, please don't hesitate to call.      Armida Mendez PA-C

## 2025-02-08 NOTE — ED NOTES
Patient verbalized understanding of discharge instructions including medication administration and recommended follow up care as noted on discharge instructions.  Written discharge instructions given, denies any further questions.  Prescriptions: none. Barriers to learning identified and addressed:  None observed. See providers notes for further assessment.

## 2025-02-10 ENCOUNTER — PATIENT OUTREACH (OUTPATIENT)
Dept: CARE COORDINATION | Facility: CLINIC | Age: 35
End: 2025-02-10
Payer: COMMERCIAL

## 2025-02-10 NOTE — LETTER
Chanel Cotto  [address] [address]    Dear Chanel Cotto,      I am a team member within the [facility] with [facility]. I recently contacted you to ensure you are doing well following a visit within our health system. I also wanted to take this chance to introduce Clinic Care Coordination should you have any interest in this program in the future.    Below is a description of Clinic Care Coordination and how this team can further assist you:       The Clinic Care Coordination team is made up of a Registered Nurse, , Financial Resource Worker, and a Community Health Worker who understand and can help navigate the health care system. The goal of clinic care coordination is to help you manage your health, improve access to care, and achieve optimal health outcomes. They work alongside your provider to assist you in determining your health and social needs, obtain health care and community resources, and provide you with necessary information and education. Clinic Care Coordination can work with you through any barriers and develop a care plan that helps coordinate and strengthen the relationship between you and your care team.    If you wish to connect with the Clinic Care Coordination Team, please let your [facility] Primary Care Provider or Clinic Care Team know and they can place a referral. The Clinic Care Coordination team will then reach out by phone to further support you.    We are focused on providing you with the highest-quality healthcare experience possible.    Sincerely,   Your care team with [facility]'s [address] [place] [address] ([phone]).

## 2025-02-10 NOTE — PROGRESS NOTES
[facility]  Community Health Worker Initial Outreach    CHW Initial Information Gathering:  Referral Source: ED Follow-Up  CHW Additional Questions  MyChart active?: Yes    Patient accepts CC: No, patient declined at this time. Patient will be sent Care Coordination introduction letter for future reference.       Berenice Pardo  Community Health Worker  [facility]    *Conne[facility] Care [facility] Team does NOT follow patient ongoing. Referrals are identified based on internal discharge reports and the outreach is to ensure patient has an understanding of their discharge instructions.

## 2025-02-17 ENCOUNTER — PATIENT OUTREACH (OUTPATIENT)
Dept: CARE COORDINATION | Facility: CLINIC | Age: 35
End: 2025-02-17
Payer: COMMERCIAL

## 2025-03-19 ENCOUNTER — HOSPITAL ENCOUNTER (EMERGENCY)
Facility: CLINIC | Age: 35
Discharge: HOME | End: 2025-03-19
Payer: COMMERCIAL

## 2025-03-19 VITALS
OXYGEN SATURATION: 96 % | HEART RATE: 111 BPM | TEMPERATURE: 98 F | HEIGHT: 67 IN | BODY MASS INDEX: 20.4 KG/M2 | DIASTOLIC BLOOD PRESSURE: 103 MMHG | RESPIRATION RATE: 18 BRPM | WEIGHT: 130 LBS | SYSTOLIC BLOOD PRESSURE: 157 MMHG

## 2025-03-19 DIAGNOSIS — S61.011A LACERATION OF RIGHT THUMB WITHOUT FOREIGN BODY WITHOUT DAMAGE TO NAIL, INITIAL ENCOUNTER: ICD-10-CM

## 2025-03-19 PROCEDURE — 250N000009 HC RX 250

## 2025-03-19 PROCEDURE — 99283 EMERGENCY DEPT VISIT LOW MDM: CPT

## 2025-03-19 PROCEDURE — 12001 RPR S/N/AX/GEN/TRNK 2.5CM/<: CPT

## 2025-03-19 RX ORDER — GINSENG 100 MG
CAPSULE ORAL ONCE
Status: COMPLETED | OUTPATIENT
Start: 2025-03-19 | End: 2025-03-19

## 2025-03-19 RX ADMIN — BACITRACIN: 500 OINTMENT TOPICAL at 21:20
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
## 2025-03-20 NOTE — DISCHARGE INSTRUCTIONS
Your wound was closed with 3 absorbable sutures in the ED today. These do not need to be removed as they will dissolve on their own. Please be aware of signs of infection include redness, swelling, drainage or increased pain. No antibiotics are necessary at this point. Please return to the ED for any new or worsening symptoms.

## 2025-07-12 ENCOUNTER — HEALTH MAINTENANCE LETTER (OUTPATIENT)
Age: 35
End: 2025-07-12